# Patient Record
Sex: MALE | Race: WHITE | NOT HISPANIC OR LATINO | Employment: FULL TIME | URBAN - NONMETROPOLITAN AREA
[De-identification: names, ages, dates, MRNs, and addresses within clinical notes are randomized per-mention and may not be internally consistent; named-entity substitution may affect disease eponyms.]

---

## 2017-03-12 DIAGNOSIS — R07.89 CHEST PRESSURE: ICD-10-CM

## 2017-03-13 RX ORDER — RANOLAZINE 500 MG/1
TABLET, FILM COATED, EXTENDED RELEASE ORAL
Qty: 180 TABLET | Refills: 0 | Status: SHIPPED | OUTPATIENT
Start: 2017-03-13 | End: 2017-06-05 | Stop reason: SDUPTHER

## 2017-06-05 DIAGNOSIS — R07.89 CHEST PRESSURE: ICD-10-CM

## 2017-06-06 RX ORDER — RANOLAZINE 500 MG/1
TABLET, FILM COATED, EXTENDED RELEASE ORAL
Qty: 60 TABLET | Refills: 0 | Status: SHIPPED | OUTPATIENT
Start: 2017-06-06 | End: 2017-07-05 | Stop reason: SDUPTHER

## 2017-07-05 DIAGNOSIS — R07.89 CHEST PRESSURE: ICD-10-CM

## 2017-07-05 RX ORDER — RANOLAZINE 500 MG/1
TABLET, FILM COATED, EXTENDED RELEASE ORAL
Qty: 60 TABLET | Refills: 0 | Status: SHIPPED | OUTPATIENT
Start: 2017-07-05 | End: 2017-08-01 | Stop reason: SDUPTHER

## 2017-08-01 DIAGNOSIS — R07.89 CHEST PRESSURE: ICD-10-CM

## 2017-08-01 RX ORDER — RANOLAZINE 500 MG/1
TABLET, FILM COATED, EXTENDED RELEASE ORAL
Qty: 60 TABLET | Refills: 0 | Status: SHIPPED | OUTPATIENT
Start: 2017-08-01 | End: 2017-08-09 | Stop reason: SDUPTHER

## 2017-08-09 ENCOUNTER — OFFICE VISIT (OUTPATIENT)
Dept: CARDIOLOGY | Facility: CLINIC | Age: 62
End: 2017-08-09

## 2017-08-09 VITALS
HEIGHT: 76 IN | HEART RATE: 86 BPM | WEIGHT: 232.25 LBS | BODY MASS INDEX: 28.28 KG/M2 | DIASTOLIC BLOOD PRESSURE: 80 MMHG | OXYGEN SATURATION: 98 % | SYSTOLIC BLOOD PRESSURE: 116 MMHG

## 2017-08-09 DIAGNOSIS — E78.00 HYPERCHOLESTEREMIA: ICD-10-CM

## 2017-08-09 DIAGNOSIS — I25.10 ENDOTHELIAL DYSFUNCTION OF CORONARY ARTERY: Primary | ICD-10-CM

## 2017-08-09 DIAGNOSIS — R07.89 CHEST PRESSURE: ICD-10-CM

## 2017-08-09 DIAGNOSIS — I10 ESSENTIAL HYPERTENSION: ICD-10-CM

## 2017-08-09 PROCEDURE — 99213 OFFICE O/P EST LOW 20 MIN: CPT | Performed by: NURSE PRACTITIONER

## 2017-08-09 RX ORDER — RANOLAZINE 500 MG/1
500 TABLET, EXTENDED RELEASE ORAL 2 TIMES DAILY
Qty: 180 TABLET | Refills: 3 | Status: SHIPPED | OUTPATIENT
Start: 2017-08-09 | End: 2017-08-28 | Stop reason: SDUPTHER

## 2017-08-09 NOTE — PROGRESS NOTES
Chief Complaint   Patient presents with   • Follow-up     Chest pain and fatigue when lifting anything heavy and during intamacy. Would like 90 days of Ranexa sent in.       Subjective       Dell Fowler is a 61 y.o. male history of hypertension, hypercholesterolemia who also has endothelial dysfunction.  Cardiac catheterizations have shown normal coronaries. He has been managed medically.   Today he comes to the office for a follow up appointment and denies new or worsening symptoms. He states he has some stable angina only with significant exertion, but better than before. He continues to walk most days without problem     HPI         Cardiac History:    Past Surgical History:   Procedure Laterality Date   • CARDIAC CATHETERIZATION  11/22/2006    (Dr. Briseno) Normal Coronaries   • CARDIAC CATHETERIZATION  02/29/2016    normal coronaries EF 50%   • CARDIOVASCULAR STRESS TEST  11/21/2006    ([facility] Dr. Briseno) 9 min, 93% THR. EF 48%. Anteroseptal and Inferior Ischemia   • CARDIOVASCULAR STRESS TEST  02/25/2016    6 min 30 sec, 91% THR. EF (s) 24%. (R) 43%. Inferoseptal Ischemia   • ECHO - CONVERTED  11/2006    ([facility]) Dr. Briseno Normal EF    • ECHO - CONVERTED  02/25/2016    EF 45%. Inferoseptal WMA       Current Outpatient Prescriptions   Medication Sig Dispense Refill   • Arginine (L-ARGININE MAXIMUM STRENGTH) 1000 MG tablet Take 1 tablet by mouth 2 (two) times a day. 180 each 3   • aspirin 81 MG EC tablet Take 81 mg by mouth daily.     • atorvastatin (LIPITOR) 10 MG tablet Take 1 tablet by mouth daily. 90 tablet 3   • cetirizine (ZyrTEC) 10 MG tablet Take 10 mg by mouth daily.     • ibuprofen (ADVIL,MOTRIN) 800 MG tablet Take 800 mg by mouth every 6 (six) hours as needed for mild pain (1-3).     • lisinopril (PRINIVIL,ZESTRIL) 5 MG tablet Take 1 tablet by mouth daily. 90 tablet 3   • naproxen (NAPROSYN) 500 MG tablet Take 500 mg by mouth as needed for mild pain (1-3).     • omeprazole (PriLOSEC) 20 MG capsule  Take 1 capsule by mouth daily. 90 capsule 3   • ranolazine (RANEXA) 500 MG 12 hr tablet Take 1 tablet by mouth 2 (Two) Times a Day. 180 tablet 3   • tamsulosin (FLOMAX) 0.4 MG capsule 24 hr capsule Take 1 capsule by mouth every night.       No current facility-administered medications for this visit.        Review of patient's allergies indicates no known allergies.    Past Medical History:   Diagnosis Date   • BPH (benign prostatic hyperplasia)     followed by Dr. Llamas   • GERD (gastroesophageal reflux disease)    • Hyperlipidemia    • Hypertension    • S/P wrist surgery     bilateral surgical repair       Social History     Social History   • Marital status:      Spouse name: N/A   • Number of children: N/A   • Years of education: N/A     Occupational History   • Not on file.     Social History Main Topics   • Smoking status: Former Smoker     Quit date: 1976   • Smokeless tobacco: Never Used   • Alcohol use Yes      Comment: occasional mixed drink   • Drug use: No   • Sexual activity: Not on file     Other Topics Concern   • Not on file     Social History Narrative       Family History   Problem Relation Age of Onset   • COPD Mother    • Heart failure Mother    • Heart attack Father      multiple- first in his 50's   • Heart disease Father      cabg in his 60's   • Heart disease Other      stenting at age 65       Review of Systems   Constitution: Negative for decreased appetite and malaise/fatigue.   HENT: Negative for congestion and sore throat.    Eyes: Negative for blurred vision.   Cardiovascular: Positive for chest pain (pressure type discomfort with signifcant exertion, same ). Negative for dyspnea on exertion, leg swelling, near-syncope and palpitations.   Respiratory: Negative for shortness of breath and sleep disturbances due to breathing.    Endocrine: Negative for cold intolerance and heat intolerance.   Hematologic/Lymphatic: Negative for adenopathy. Does not bruise/bleed easily.   Skin:  "Negative for itching, nail changes and skin cancer.   Musculoskeletal: Negative for arthritis and myalgias.   Gastrointestinal: Negative for abdominal pain, dysphagia and heartburn.   Genitourinary: Negative for dysuria and hematuria.   Neurological: Negative for dizziness, light-headedness, seizures and vertigo.   Psychiatric/Behavioral: Negative for altered mental status. The patient does not have insomnia.    Allergic/Immunologic: Negative for environmental allergies and hives.        Diabetes- No  Thyroid-normal    Objective     /80 (BP Location: Left arm, Patient Position: Sitting, Cuff Size: Adult)  Pulse 86  Ht 76\" (193 cm)  Wt 232 lb 4 oz (105 kg)  SpO2 98%  BMI 28.27 kg/m2    Physical Exam   Constitutional: He is oriented to person, place, and time. He appears well-nourished.   HENT:   Head: Normocephalic.   Eyes: Conjunctivae are normal. Pupils are equal, round, and reactive to light.   Neck: Normal range of motion. Neck supple. No JVD present.   Cardiovascular: Normal rate, regular rhythm, S1 normal and S2 normal.    No murmur heard.  Pulmonary/Chest: Breath sounds normal. He has no rales.   Abdominal: Soft. Bowel sounds are normal. There is no tenderness.   Musculoskeletal: Normal range of motion. He exhibits no edema.   Neurological: He is alert and oriented to person, place, and time.   Skin: Skin is warm and dry. No rash noted. No pallor.   Psychiatric: He has a normal mood and affect. His behavior is normal.      Procedures        Assessment/Plan      Dell was seen today for follow-up.    Diagnoses and all orders for this visit:    Endothelial dysfunction of coronary artery    Essential hypertension    Hypercholesteremia    Chest pressure  -     ranolazine (RANEXA) 500 MG 12 hr tablet; Take 1 tablet by mouth 2 (Two) Times a Day.      Mr. Fowler appears stable from a cardiac standpoint. We will continue same dose of Ranexa and refills given. His vital signs are stable. He maintains " normal daily activity without symptoms. No cardiac testing warranted at this time. I encouraged him to maintain good diet and exercise. He will follow with you for management of labs.   We will plan to see him annually or sooner for problems.              Electronically signed by OREN Kendrick,  August 11, 2017 4:09 PM

## 2017-08-11 PROBLEM — I10 ESSENTIAL HYPERTENSION: Status: ACTIVE | Noted: 2017-08-11

## 2017-08-11 PROBLEM — I25.10 ENDOTHELIAL DYSFUNCTION OF CORONARY ARTERY: Status: ACTIVE | Noted: 2017-08-11

## 2017-08-11 PROBLEM — R07.89 CHEST PRESSURE: Status: ACTIVE | Noted: 2017-08-11

## 2017-08-11 PROBLEM — E78.00 HYPERCHOLESTEREMIA: Status: ACTIVE | Noted: 2017-08-11

## 2017-08-28 ENCOUNTER — TELEPHONE (OUTPATIENT)
Dept: CARDIOLOGY | Facility: CLINIC | Age: 62
End: 2017-08-28

## 2017-08-28 DIAGNOSIS — R07.89 CHEST PRESSURE: ICD-10-CM

## 2017-08-28 RX ORDER — RANOLAZINE 500 MG/1
500 TABLET, EXTENDED RELEASE ORAL 2 TIMES DAILY
Qty: 180 TABLET | Refills: 3 | Status: SHIPPED | OUTPATIENT
Start: 2017-08-28 | End: 2018-05-04 | Stop reason: SDUPTHER

## 2017-08-28 NOTE — TELEPHONE ENCOUNTER
[facility] pharmacy faxed over a 90 day supply request for ranexa. Script sent.

## 2017-11-03 DIAGNOSIS — R07.89 CHEST PRESSURE: ICD-10-CM

## 2017-11-03 DIAGNOSIS — E78.00 HYPERCHOLESTEREMIA: ICD-10-CM

## 2017-11-03 RX ORDER — OMEPRAZOLE 20 MG/1
CAPSULE, DELAYED RELEASE ORAL
Qty: 90 CAPSULE | Refills: 2 | OUTPATIENT
Start: 2017-11-03

## 2017-11-03 RX ORDER — ATORVASTATIN CALCIUM 10 MG/1
TABLET, FILM COATED ORAL
Qty: 30 TABLET | Refills: 0 | Status: SHIPPED | OUTPATIENT
Start: 2017-11-03 | End: 2017-11-28 | Stop reason: SDUPTHER

## 2017-11-19 DIAGNOSIS — I10 ESSENTIAL HYPERTENSION: ICD-10-CM

## 2017-11-20 RX ORDER — LISINOPRIL 5 MG/1
TABLET ORAL
Qty: 90 TABLET | Refills: 2 | Status: SHIPPED | OUTPATIENT
Start: 2017-11-20 | End: 2018-05-14 | Stop reason: SDUPTHER

## 2017-11-24 DIAGNOSIS — E78.00 HYPERCHOLESTEREMIA: ICD-10-CM

## 2017-11-27 RX ORDER — ATORVASTATIN CALCIUM 10 MG/1
TABLET, FILM COATED ORAL
Qty: 90 TABLET | Refills: 1 | OUTPATIENT
Start: 2017-11-27

## 2017-11-28 ENCOUNTER — TELEPHONE (OUTPATIENT)
Dept: CARDIOLOGY | Facility: CLINIC | Age: 62
End: 2017-11-28

## 2017-11-28 DIAGNOSIS — E78.00 HYPERCHOLESTEREMIA: ICD-10-CM

## 2017-11-28 RX ORDER — PSYLLIUM HUSK 0.4 G
CAPSULE ORAL
Qty: 180 EACH | Refills: 3 | Status: SHIPPED | OUTPATIENT
Start: 2017-11-28

## 2017-11-28 RX ORDER — ATORVASTATIN CALCIUM 10 MG/1
10 TABLET, FILM COATED ORAL DAILY
Qty: 90 TABLET | Refills: 0 | Status: SHIPPED | OUTPATIENT
Start: 2017-11-28 | End: 2018-02-15 | Stop reason: SDUPTHER

## 2017-11-28 NOTE — TELEPHONE ENCOUNTER
Patient called in requesting 90 day refill for Atorvastatin be sent into pharmacy due to insurance will not pay for 30 day refill's. Informed patient will need labs for further refill's. States had labs done today and will have copy faxed to our office.

## 2018-02-15 DIAGNOSIS — E78.00 HYPERCHOLESTEREMIA: ICD-10-CM

## 2018-02-16 RX ORDER — ATORVASTATIN CALCIUM 10 MG/1
TABLET, FILM COATED ORAL
Qty: 90 TABLET | Refills: 3 | Status: SHIPPED | OUTPATIENT
Start: 2018-02-16 | End: 2018-05-14 | Stop reason: SDUPTHER

## 2018-05-04 ENCOUNTER — TELEPHONE (OUTPATIENT)
Dept: CARDIOLOGY | Facility: CLINIC | Age: 63
End: 2018-05-04

## 2018-05-04 DIAGNOSIS — R07.89 CHEST PRESSURE: ICD-10-CM

## 2018-05-04 RX ORDER — RANOLAZINE 500 MG/1
500 TABLET, EXTENDED RELEASE ORAL EVERY 12 HOURS SCHEDULED
Qty: 180 TABLET | Refills: 3 | Status: SHIPPED | OUTPATIENT
Start: 2018-05-04 | End: 2018-05-15

## 2018-05-04 NOTE — TELEPHONE ENCOUNTER
Patient's copay of Ranexa went up to >$200 per month. Pt is asking for a written Rx so he can look around to see if he can find it cheaper. Written RX printed for patient to pick-up.

## 2018-05-14 ENCOUNTER — TELEPHONE (OUTPATIENT)
Dept: CARDIOLOGY | Facility: CLINIC | Age: 63
End: 2018-05-14

## 2018-05-14 DIAGNOSIS — E78.00 HYPERCHOLESTEREMIA: ICD-10-CM

## 2018-05-14 DIAGNOSIS — I10 ESSENTIAL HYPERTENSION: ICD-10-CM

## 2018-05-14 RX ORDER — ATORVASTATIN CALCIUM 10 MG/1
10 TABLET, FILM COATED ORAL DAILY
Qty: 90 TABLET | Refills: 3 | Status: SHIPPED | OUTPATIENT
Start: 2018-05-14 | End: 2019-10-31 | Stop reason: SDUPTHER

## 2018-05-14 RX ORDER — LISINOPRIL 5 MG/1
5 TABLET ORAL DAILY
Qty: 90 TABLET | Refills: 3 | Status: SHIPPED | OUTPATIENT
Start: 2018-05-14 | End: 2019-10-31 | Stop reason: SDUPTHER

## 2018-05-14 NOTE — TELEPHONE ENCOUNTER
Imdur 30 mg 1/2 tablet daily, can try taking at night to avoid side effects. If he develops chest pain then we can try increasing to full tablet or 1/2 tablet bid. Thanks.

## 2018-05-14 NOTE — TELEPHONE ENCOUNTER
Patient requesting scripts for Lisinopril and Atorvastatin to be sent to [facility] 90 day supplies. Script sent on 5/14/18

## 2018-05-14 NOTE — TELEPHONE ENCOUNTER
Patient called and is concerned about the cost of his Ranexa. Asking if we could change to something different. Patient states he has never tried imdur.

## 2018-05-15 RX ORDER — ISOSORBIDE MONONITRATE 30 MG/1
TABLET, EXTENDED RELEASE ORAL
Qty: 30 TABLET | Refills: 0 | Status: SHIPPED | OUTPATIENT
Start: 2018-05-15 | End: 2018-06-26 | Stop reason: SDUPTHER

## 2018-05-15 NOTE — TELEPHONE ENCOUNTER
Spoke with patient. He is aware of recommendations. Script sent to pharmacy. Advised to call if any problems. Pt voiced understanding.

## 2018-06-26 ENCOUNTER — TELEPHONE (OUTPATIENT)
Dept: CARDIOLOGY | Facility: CLINIC | Age: 63
End: 2018-06-26

## 2018-06-26 RX ORDER — ISOSORBIDE MONONITRATE 30 MG/1
TABLET, EXTENDED RELEASE ORAL
Qty: 30 TABLET | Refills: 5 | Status: SHIPPED | OUTPATIENT
Start: 2018-06-26 | End: 2018-12-16 | Stop reason: SDUPTHER

## 2018-06-26 RX ORDER — RANOLAZINE 500 MG/1
500 TABLET, EXTENDED RELEASE ORAL DAILY
Qty: 14 TABLET | Refills: 0 | COMMUNITY
Start: 2018-06-26 | End: 2019-07-23 | Stop reason: SDUPTHER

## 2018-06-26 NOTE — TELEPHONE ENCOUNTER
Patient called with an update regarding imdur (see telephone encounter from 05/14/18). States he tried taking just 30 mg 1/2 daily and was still having some chest pressure. He tried increasing to 1/2 tab BID and was having headache. States that he now takes Ranexa 500 mg in the morning and Imdur 30 mg 1/2 tab at night and this seems to be working. Asking if you were okay with this combination.

## 2018-06-26 NOTE — TELEPHONE ENCOUNTER
I agree to continue combination of medication based on improvement of symptoms and tolerance. Thanks.

## 2018-06-26 NOTE — TELEPHONE ENCOUNTER
Patient aware. Samples of Ranexa ready for pickup. I have also included some discount cards. Script for Imdur sent to pharmacy.

## 2018-08-13 ENCOUNTER — OFFICE VISIT (OUTPATIENT)
Dept: CARDIOLOGY | Facility: CLINIC | Age: 63
End: 2018-08-13

## 2018-08-13 VITALS
HEIGHT: 76 IN | WEIGHT: 239 LBS | SYSTOLIC BLOOD PRESSURE: 120 MMHG | HEART RATE: 72 BPM | BODY MASS INDEX: 29.1 KG/M2 | DIASTOLIC BLOOD PRESSURE: 80 MMHG

## 2018-08-13 DIAGNOSIS — I10 ESSENTIAL HYPERTENSION: Primary | ICD-10-CM

## 2018-08-13 DIAGNOSIS — E66.3 OVERWEIGHT: ICD-10-CM

## 2018-08-13 DIAGNOSIS — R07.89 CHEST PRESSURE: ICD-10-CM

## 2018-08-13 DIAGNOSIS — E78.00 HYPERCHOLESTEREMIA: ICD-10-CM

## 2018-08-13 DIAGNOSIS — I25.10 ENDOTHELIAL DYSFUNCTION OF CORONARY ARTERY: ICD-10-CM

## 2018-08-13 PROCEDURE — 99213 OFFICE O/P EST LOW 20 MIN: CPT | Performed by: NURSE PRACTITIONER

## 2018-08-13 NOTE — PROGRESS NOTES
Chief Complaint   Patient presents with   • Follow-up     For cardiac management. Patient is on aspirin. Reports that he occasionally has chest pains, but not as often as he was. Last lab work was done on 11/28/17 per PCP, in chart under media.    • Med Refill     Did not bring medication list.        Cardiac Complaints  chest pressure/discomfort      Subjective   Dell Fowler is a 62 y.o. male with hypertension, hypercholesterolemia, and endothelial dysfunction.  Cardiac catheterizations have shown normal coronaries. He has been managed medically. He returns today for follow up and denies any new concerns.  He does have chest pain randomly but states much better with ranexa and imdur. No recent med list is available for review.  Labs last done in November of 2017 but states they will be done again in November of this year.  He does state ranexa and imdur therapy have helped as well and with ranexa 500 mg in AM and imdur 30 mg 1/2 at night.  No medication list is currently available for review, need for refills denied.      Cardiac History  Past Surgical History:   Procedure Laterality Date   • CARDIAC CATHETERIZATION  11/22/2006    (Dr. Briseno) Normal Coronaries   • CARDIAC CATHETERIZATION  02/29/2016    normal coronaries EF 50%   • CARDIOVASCULAR STRESS TEST  11/21/2006    ([facility] Dr. Briseno) 9 min, 93% THR. EF 48%. Anteroseptal and Inferior Ischemia   • CARDIOVASCULAR STRESS TEST  02/25/2016    6 min 30 sec, 91% THR. EF (s) 24%. (R) 43%. Inferoseptal Ischemia   • ECHO - CONVERTED  11/2006    ([facility]) Dr. Briseno Normal EF    • ECHO - CONVERTED  02/25/2016    EF 45%. Inferoseptal WMA       Current Outpatient Prescriptions   Medication Sig Dispense Refill   • aspirin 81 MG EC tablet Take 81 mg by mouth daily.     • atorvastatin (LIPITOR) 10 MG tablet Take 1 tablet by mouth Daily. 90 tablet 3   • cetirizine (ZyrTEC) 10 MG tablet Take 10 mg by mouth daily.     • ibuprofen (ADVIL,MOTRIN) 800 MG tablet Take 800 mg by  mouth every 6 (six) hours as needed for mild pain (1-3).     • isosorbide mononitrate (IMDUR) 30 MG 24 hr tablet Take 1/2-1 tablet by mouth daily as directed 30 tablet 5   • L-ARGININE MAXIMUM STRENGTH 1000 MG tablet TAKE 1 TABLET BY MOUTH 2 (TWO) TIMES A DAY. 180 each 3   • lisinopril (PRINIVIL,ZESTRIL) 5 MG tablet Take 1 tablet by mouth Daily. 90 tablet 3   • naproxen (NAPROSYN) 500 MG tablet Take 500 mg by mouth as needed for mild pain (1-3).     • omeprazole (PriLOSEC) 20 MG capsule Take 1 capsule by mouth daily. 90 capsule 3   • ranolazine (RANEXA) 500 MG 12 hr tablet Take 1 tablet by mouth Daily. 14 tablet 0   • tamsulosin (FLOMAX) 0.4 MG capsule 24 hr capsule Take 1 capsule by mouth every night.       No current facility-administered medications for this visit.        Patient has no known allergies.    Past Medical History:   Diagnosis Date   • BPH (benign prostatic hyperplasia)     followed by Dr. Llamas   • GERD (gastroesophageal reflux disease)    • Hyperlipidemia    • Hypertension    • S/P wrist surgery     bilateral surgical repair       Social History     Social History   • Marital status:      Spouse name: N/A   • Number of children: N/A   • Years of education: N/A     Occupational History   • Not on file.     Social History Main Topics   • Smoking status: Former Smoker     Quit date: 1976   • Smokeless tobacco: Never Used   • Alcohol use Yes      Comment: occasional mixed drink   • Drug use: No   • Sexual activity: Not on file     Other Topics Concern   • Not on file     Social History Narrative   • No narrative on file       Family History   Problem Relation Age of Onset   • COPD Mother    • Heart failure Mother    • Heart attack Father         multiple- first in his 50's   • Heart disease Father         cabg in his 60's   • Heart disease Other         stenting at age 65       Review of Systems   Constitution: Negative for weakness and malaise/fatigue.   Cardiovascular: Positive for chest pain.  "Negative for dyspnea on exertion, irregular heartbeat, near-syncope, orthopnea, palpitations and syncope.   Respiratory: Negative for shortness of breath and wheezing.    Musculoskeletal: Negative for back pain, joint swelling and muscle cramps.   Gastrointestinal: Negative for anorexia, flatus, nausea and vomiting.   Genitourinary: Negative for dysuria, hematuria, hesitancy and nocturia.   Neurological: Negative for dizziness, light-headedness and loss of balance.   Psychiatric/Behavioral: Negative for depression and memory loss. The patient is not nervous/anxious.            Objective     /80 (BP Location: Right arm)   Pulse 72   Ht 193 cm (75.98\")   Wt 108 kg (239 lb)   BMI 29.10 kg/m²     Physical Exam   Constitutional: He is oriented to person, place, and time. He appears well-developed and well-nourished.   HENT:   Head: Normocephalic and atraumatic.   Eyes: Pupils are equal, round, and reactive to light. EOM are normal.   Neck: Normal range of motion. Neck supple.   Cardiovascular: Normal rate and regular rhythm.    Pulmonary/Chest: Effort normal and breath sounds normal.   Abdominal: Soft.   Musculoskeletal: Normal range of motion.   Neurological: He is alert and oriented to person, place, and time.   Skin: Skin is warm and dry.   Psychiatric: He has a normal mood and affect.       Procedures    Assessment/Plan     HR and BP are stable today.  HTN remains well managed on current, no adjustment advised.  For endothelial dysfunction and chest pain, he remains well managed on current imdur and ranexa therapy.  He was advised to continue both at current and he may use an extra half of imdur if needed for chest pain.  Labs including FLP he reports with your office for lipid management.  He continues use of statin without concerns.  Could we have next labs from November for our review?  BMI elevated at 29, good cardiac diet with limited caloric intake, saturated fats, starches, and fried foods " encouraged.  Yearly follow up will be continued as cardiac status appears stable and no new or worsening cardiac concerns are voiced.  If sooner follow up should be needed, patient encouraged to call.        Problems Addressed this Visit        Cardiovascular and Mediastinum    Endothelial dysfunction of coronary artery    Essential hypertension - Primary    Hypercholesteremia       Nervous and Auditory    Chest pressure          Patient's Body mass index is 29.1 kg/m². BMI is above normal parameters. Recommendations include: nutrition counseling.          Electronically signed by OREN Hampton August 13, 2018 4:09 PM

## 2018-12-17 RX ORDER — ISOSORBIDE MONONITRATE 30 MG/1
TABLET, EXTENDED RELEASE ORAL
Qty: 30 TABLET | Refills: 5 | Status: SHIPPED | OUTPATIENT
Start: 2018-12-17 | End: 2019-07-18 | Stop reason: SINTOL

## 2019-07-18 ENCOUNTER — TELEPHONE (OUTPATIENT)
Dept: CARDIOLOGY | Facility: CLINIC | Age: 64
End: 2019-07-18

## 2019-07-18 NOTE — TELEPHONE ENCOUNTER
"Patient called in and said  PCP was wanting to take him off the Isosorbide 30 mg d/t  Low blood pressure 90/60 . He has a follow up appt 7-23-19 12pm with you . He is going to be going out of town this weekend and was wondering if he would \" mess himself up \" if he  Quits taking the Isosorbide .  "

## 2019-07-18 NOTE — TELEPHONE ENCOUNTER
I spoke with patient about discontinuing Isosorbide . Patient advised to take his medication with him as he goes out of town.  He states understanding

## 2019-07-18 NOTE — TELEPHONE ENCOUNTER
According to his last note, he is also taking Ranexa which does not lower BP. Continue Ranexa and agree to stop Imdur due to low BP. He can take Imdur with him and if he has any issues can take 1/2 tablet if needed.

## 2019-07-23 ENCOUNTER — OFFICE VISIT (OUTPATIENT)
Dept: CARDIOLOGY | Facility: CLINIC | Age: 64
End: 2019-07-23

## 2019-07-23 VITALS
SYSTOLIC BLOOD PRESSURE: 130 MMHG | HEART RATE: 76 BPM | BODY MASS INDEX: 30.31 KG/M2 | DIASTOLIC BLOOD PRESSURE: 74 MMHG | WEIGHT: 236.2 LBS | HEIGHT: 74 IN

## 2019-07-23 DIAGNOSIS — E78.00 HYPERCHOLESTEREMIA: ICD-10-CM

## 2019-07-23 DIAGNOSIS — R53.83 OTHER FATIGUE: ICD-10-CM

## 2019-07-23 DIAGNOSIS — I10 ESSENTIAL HYPERTENSION: ICD-10-CM

## 2019-07-23 DIAGNOSIS — I25.10 ENDOTHELIAL DYSFUNCTION OF CORONARY ARTERY: ICD-10-CM

## 2019-07-23 DIAGNOSIS — R42 DIZZINESS: ICD-10-CM

## 2019-07-23 DIAGNOSIS — R42 EPISODIC LIGHTHEADEDNESS: ICD-10-CM

## 2019-07-23 DIAGNOSIS — I51.9 LV DYSFUNCTION: Primary | ICD-10-CM

## 2019-07-23 PROCEDURE — 93000 ELECTROCARDIOGRAM COMPLETE: CPT | Performed by: NURSE PRACTITIONER

## 2019-07-23 PROCEDURE — 99214 OFFICE O/P EST MOD 30 MIN: CPT | Performed by: NURSE PRACTITIONER

## 2019-07-23 RX ORDER — ISOSORBIDE DINITRATE 30 MG/1
30 TABLET ORAL 2 TIMES DAILY PRN
COMMUNITY
End: 2019-10-31

## 2019-07-23 RX ORDER — FINASTERIDE 5 MG/1
5 TABLET, FILM COATED ORAL DAILY
COMMUNITY

## 2019-07-23 RX ORDER — RANOLAZINE 500 MG/1
500 TABLET, EXTENDED RELEASE ORAL 2 TIMES DAILY
Qty: 60 TABLET | Refills: 6 | COMMUNITY
Start: 2019-07-23 | End: 2019-07-24 | Stop reason: SDUPTHER

## 2019-07-23 NOTE — PROGRESS NOTES
Chief Complaint   Patient presents with   • Follow-up     Cardiac management . PCP wanted patient to be seen d/t SOA , Hypotension and dizziness ..Says he has no energy . He takes Aspirin 81 mg daily . Has an appointment scheduled to come back to this office 8-13-19. Monitors BP at home daily.   • Chest Pain     Has had 2 episodes requiring him to take his Isosorbide, since Friday 7-19-19 .. Hypotensive episodes have been close to time of taking Isosorbide    • Med Refill     Does not need refills today . Had medication list with him today.       Subjective       Dell Fowler is a 63 y.o. male  with hypertension, hypercholesterolemia, and endothelial dysfunction.  Cardiac catheterizations have shown normal coronaries. He has been managed medically. Recently has had urology issues with prostate and kidney stone.     Today he returns due to issues with dizziness, generalized weakness and chest pain. His blood pressure has been low and Isosorbide stopped. When he stopped the long acting Nitrate he developed chest pressure on two occassions for which he took 1/2 tablet of nitrate along with rest and symptoms resolved. No palpitations noted. With normal daily activity, he feels more shortness of breath and fatigue.      HPI     Cardiac History:    Past Surgical History:   Procedure Laterality Date   • CARDIAC CATHETERIZATION  11/22/2006    (Dr. Briseno) Normal Coronaries   • CARDIAC CATHETERIZATION  02/29/2016    normal coronaries EF 50%   • CARDIOVASCULAR STRESS TEST  11/21/2006    ([facility] Dr. Briseno) 9 min, 93% THR. EF 48%. Anteroseptal and Inferior Ischemia   • CARDIOVASCULAR STRESS TEST  02/25/2016    6 min 30 sec, 91% THR. EF (s) 24%. (R) 43%. Inferoseptal Ischemia   • ECHO - CONVERTED  11/2006    ([facility]) Dr. Briseno Normal EF    • ECHO - CONVERTED  02/25/2016    EF 45%. Inferoseptal WMA       Current Outpatient Medications   Medication Sig Dispense Refill   • aspirin 81 MG EC tablet Take 81 mg by mouth daily.      • atorvastatin (LIPITOR) 10 MG tablet Take 1 tablet by mouth Daily. 90 tablet 3   • cetirizine (ZyrTEC) 10 MG tablet Take 10 mg by mouth daily.     • finasteride (PROSCAR) 5 MG tablet Take 5 mg by mouth Daily.     • isosorbide dinitrate (ISORDIL) 30 MG tablet Take 30 mg by mouth 2 (Two) Times a Day As Needed. Takes 1/2 tablet PRN     • L-ARGININE MAXIMUM STRENGTH 1000 MG tablet TAKE 1 TABLET BY MOUTH 2 (TWO) TIMES A DAY. 180 each 3   • lisinopril (PRINIVIL,ZESTRIL) 5 MG tablet Take 1 tablet by mouth Daily. 90 tablet 3   • omeprazole (PriLOSEC) 20 MG capsule Take 1 capsule by mouth daily. 90 capsule 3   • tamsulosin (FLOMAX) 0.4 MG capsule 24 hr capsule Take 1 capsule by mouth every night.     • ranolazine (RANEXA) 500 MG 12 hr tablet Take 1 tablet by mouth 2 (Two) Times a Day. 60 tablet 6     No current facility-administered medications for this visit.        Patient has no known allergies.    Past Medical History:   Diagnosis Date   • BPH (benign prostatic hyperplasia)     followed by Dr. Llamas   • GERD (gastroesophageal reflux disease)    • Hyperlipidemia    • Hypertension    • S/P wrist surgery     bilateral surgical repair       Social History     Socioeconomic History   • Marital status:      Spouse name: Not on file   • Number of children: Not on file   • Years of education: Not on file   • Highest education level: Not on file   Tobacco Use   • Smoking status: Former Smoker     Last attempt to quit:      Years since quittin.5   • Smokeless tobacco: Never Used   Substance and Sexual Activity   • Alcohol use: Yes     Comment: occasional mixed drink   • Drug use: No       Family History   Problem Relation Age of Onset   • COPD Mother    • Heart failure Mother    • Heart attack Father         multiple- first in his 50's   • Heart disease Father         cabg in his 60's   • Heart disease Other         stenting at age 65       Review of Systems   Constitution: Positive for weakness and  "malaise/fatigue. Negative for decreased appetite.   HENT: Negative for congestion, hoarse voice and nosebleeds.    Eyes: Negative for blurred vision, double vision, redness and visual disturbance.   Cardiovascular: Positive for chest pain. Negative for leg swelling, near-syncope and palpitations.   Respiratory: Positive for shortness of breath and snoring. Negative for cough and sleep disturbances due to breathing.    Endocrine: Negative for polydipsia and polyphagia.   Hematologic/Lymphatic: Negative for bleeding problem. Does not bruise/bleed easily.   Skin: Negative for dry skin, flushing and itching.   Musculoskeletal: Negative for falls, joint pain, muscle cramps and muscle weakness.   Gastrointestinal: Positive for heartburn (has gotten worse at night). Negative for bloating, abdominal pain, dysphagia and melena.   Genitourinary: Positive for frequency and urgency. Negative for dysuria and hematuria.        Currently following with  for management of prostate.   Neurological: Positive for dizziness and light-headedness. Negative for headaches, numbness and paresthesias.   Psychiatric/Behavioral: Negative for altered mental status and memory loss. The patient does not have insomnia.    Allergic/Immunologic: Negative for hives and persistent infections.        Objective     /74 (BP Location: Left arm)   Pulse 76   Ht 188 cm (74\")   Wt 107 kg (236 lb 3.2 oz)   BMI 30.33 kg/m²     Physical Exam   Constitutional: He is oriented to person, place, and time. Vital signs are normal. He appears well-nourished. No distress.   HENT:   Head: Normocephalic.   Eyes: Conjunctivae are normal. Pupils are equal, round, and reactive to light.   Neck: Normal range of motion. Neck supple. Carotid bruit is not present.   Cardiovascular: Normal rate, regular rhythm, S1 normal and S2 normal.   No murmur heard.  Pulmonary/Chest: Breath sounds normal. He has no wheezes. He has no rales.   Abdominal: Soft. Bowel sounds are " normal.   Musculoskeletal: Normal range of motion. He exhibits no edema.   Neurological: He is alert and oriented to person, place, and time.   Skin: Skin is warm and dry. No pallor. Nails show no clubbing.   Psychiatric: He has a normal mood and affect. His behavior is normal.          ECG 12 Lead  Date/Time: 7/24/2019 11:55 AM  Performed by: Alpa Yen APRN  Authorized by: Alpa Yen APRN   Comparison: compared with previous ECG from 2/25/2016  Similar to previous ECG  Comparison to previous ECG: EKG per stress test showed sinus rhythm  Rhythm: sinus rhythm  BPM: 79                  Assessment/Plan      Dell was seen today for follow-up, chest pain and med refill.    Diagnoses and all orders for this visit:    LV dysfunction  -     Adult Transthoracic Echo Complete W/ Cont if Necessary Per Protocol; Future  -     Stress Test With Myocardial Perfusion One Day; Future    Essential hypertension  -     Comprehensive Metabolic Panel; Future  -     Adult Transthoracic Echo Complete W/ Cont if Necessary Per Protocol; Future  -     Stress Test With Myocardial Perfusion One Day; Future  -     US Carotid Bilateral; Future    Hypercholesteremia  -     Comprehensive Metabolic Panel; Future  -     Adult Transthoracic Echo Complete W/ Cont if Necessary Per Protocol; Future  -     Stress Test With Myocardial Perfusion One Day; Future  -     US Carotid Bilateral; Future    Endothelial dysfunction of coronary artery    Dizziness  -     Adult Transthoracic Echo Complete W/ Cont if Necessary Per Protocol; Future  -     Stress Test With Myocardial Perfusion One Day; Future  -     US Carotid Bilateral; Future    Episodic lightheadedness  -     Adult Transthoracic Echo Complete W/ Cont if Necessary Per Protocol; Future  -     Stress Test With Myocardial Perfusion One Day; Future  -     US Carotid Bilateral; Future    Other fatigue  -     TSH; Future  -     Vitamin B12; Future  -     Adult Transthoracic Echo Complete W/  Cont if Necessary Per Protocol; Future  -     Stress Test With Myocardial Perfusion One Day; Future    Other orders  -     Discontinue: ranolazine (RANEXA) 500 MG 12 hr tablet; Take 1 tablet by mouth 2 (Two) Times a Day.  -     ECG 12 Lead      Mr. Fowler presents to the office with concern over recent symptoms.  He admits to exertional chest discomfort that resolves with rest and resuming nitrate.  In the past he found Ranexa beneficial but had stopped due to cost.  Now the medication is available generic we will retry Ranexa 500 mg twice daily.  To rule out ischemic cause a nuclear stress test ordered.  To reassess LV dysfunction and look at valvular structure as well as PA pressure, an echocardiogram ordered.  He has not had carotid ultrasound in the past.  Due to dizziness we will obtain a carotid ultrasound to evaluate for any stenosis.    Today his blood pressure, heart rate, and heart rhythm are in normal range.  Agree with holding isosorbide.  Prescription given to restart Ranexa.  Advised to continue low-dose lisinopril as well as low-dose aspirin.    Baseline EKG done today that shows sinus rhythm. We will look for any arrhythmia on treadmill stress test.     Lab order placed for vitamin B12 and TSH which can be done same day as cardiac testing.    Patient's Body mass index is 30.33 kg/m². BMI is above normal parameters. Recommendations include: nutrition counseling.  Mediterranean diet advised.    Further recommendations based on test results.  We will keep appointment already scheduled.  Please call sooner for cardiac concerns.           Electronically signed by OREN Kendrick,  July 24, 2019 12:02 PM

## 2019-07-24 RX ORDER — RANOLAZINE 500 MG/1
500 TABLET, EXTENDED RELEASE ORAL 2 TIMES DAILY
Qty: 60 TABLET | Refills: 6 | Status: SHIPPED | OUTPATIENT
Start: 2019-07-24 | End: 2019-10-31 | Stop reason: SDUPTHER

## 2019-08-06 ENCOUNTER — LAB (OUTPATIENT)
Dept: LAB | Facility: HOSPITAL | Age: 64
End: 2019-08-06

## 2019-08-06 ENCOUNTER — HOSPITAL ENCOUNTER (OUTPATIENT)
Dept: CARDIOLOGY | Facility: HOSPITAL | Age: 64
Discharge: HOME | End: 2019-08-06

## 2019-08-06 DIAGNOSIS — I10 ESSENTIAL HYPERTENSION: ICD-10-CM

## 2019-08-06 DIAGNOSIS — E78.00 HYPERCHOLESTEREMIA: ICD-10-CM

## 2019-08-06 DIAGNOSIS — I51.9 LV DYSFUNCTION: ICD-10-CM

## 2019-08-06 DIAGNOSIS — R42 DIZZINESS: ICD-10-CM

## 2019-08-06 DIAGNOSIS — R42 EPISODIC LIGHTHEADEDNESS: ICD-10-CM

## 2019-08-06 DIAGNOSIS — R53.83 OTHER FATIGUE: ICD-10-CM

## 2019-08-06 LAB
ALBUMIN SERPL-MCNC: 4.33 G/DL (ref 3.5–5.2)
ALBUMIN/GLOB SERPL: 1.4 G/DL
ALP SERPL-CCNC: 84 U/L (ref 39–117)
ALT SERPL W P-5'-P-CCNC: 31 U/L (ref 1–41)
ANION GAP SERPL CALCULATED.3IONS-SCNC: 13.7 MMOL/L (ref 5–15)
AST SERPL-CCNC: 24 U/L (ref 1–40)
BH CV ECHO MEAS - ACS: 2 CM
BH CV ECHO MEAS - AO MEAN PG: 1.6 MMHG
BH CV ECHO MEAS - AO ROOT AREA (BSA CORRECTED): 1.7
BH CV ECHO MEAS - AO ROOT AREA: 12.4 CM^2
BH CV ECHO MEAS - AO ROOT DIAM: 4 CM
BH CV ECHO MEAS - AO V2 MEAN: 58.6 CM/SEC
BH CV ECHO MEAS - AO V2 VTI: 14.6 CM
BH CV ECHO MEAS - BSA(HAYCOCK): 2.4 M^2
BH CV ECHO MEAS - BSA: 2.3 M^2
BH CV ECHO MEAS - BZI_BMI: 30.3 KILOGRAMS/M^2
BH CV ECHO MEAS - BZI_METRIC_HEIGHT: 188 CM
BH CV ECHO MEAS - BZI_METRIC_WEIGHT: 107.1 KG
BH CV ECHO MEAS - EDV(CUBED): 56.1 ML
BH CV ECHO MEAS - EDV(MOD-SP2): 73 ML
BH CV ECHO MEAS - EDV(MOD-SP4): 73 ML
BH CV ECHO MEAS - EDV(TEICH): 63.1 ML
BH CV ECHO MEAS - EF(CUBED): 30.1 %
BH CV ECHO MEAS - EF(MOD-SP2): 43.8 %
BH CV ECHO MEAS - EF(MOD-SP4): 57.5 %
BH CV ECHO MEAS - EF(TEICH): 24.9 %
BH CV ECHO MEAS - ESV(CUBED): 39.2 ML
BH CV ECHO MEAS - ESV(MOD-SP2): 41 ML
BH CV ECHO MEAS - ESV(MOD-SP4): 31 ML
BH CV ECHO MEAS - ESV(TEICH): 47.4 ML
BH CV ECHO MEAS - FS: 11.2 %
BH CV ECHO MEAS - IVS/LVPW: 0.91
BH CV ECHO MEAS - IVSD: 1.3 CM
BH CV ECHO MEAS - LA DIMENSION: 3.2 CM
BH CV ECHO MEAS - LA/AO: 0.81
BH CV ECHO MEAS - LV DIASTOLIC VOL/BSA (35-75): 31.3 ML/M^2
BH CV ECHO MEAS - LV IVRT: 0.12 SEC
BH CV ECHO MEAS - LV MASS(C)D: 197.5 GRAMS
BH CV ECHO MEAS - LV MASS(C)DI: 84.7 GRAMS/M^2
BH CV ECHO MEAS - LV SYSTOLIC VOL/BSA (12-30): 13.3 ML/M^2
BH CV ECHO MEAS - LVIDD: 3.8 CM
BH CV ECHO MEAS - LVIDS: 3.4 CM
BH CV ECHO MEAS - LVLD AP2: 6.5 CM
BH CV ECHO MEAS - LVLD AP4: 5.9 CM
BH CV ECHO MEAS - LVLS AP2: 5.7 CM
BH CV ECHO MEAS - LVLS AP4: 5 CM
BH CV ECHO MEAS - LVOT AREA (M): 5.7 CM^2
BH CV ECHO MEAS - LVOT AREA: 5.8 CM^2
BH CV ECHO MEAS - LVOT DIAM: 2.7 CM
BH CV ECHO MEAS - LVPWD: 1.5 CM
BH CV ECHO MEAS - MV A MAX VEL: 79.5 CM/SEC
BH CV ECHO MEAS - MV DEC SLOPE: 256 CM/SEC^2
BH CV ECHO MEAS - MV E MAX VEL: 62.7 CM/SEC
BH CV ECHO MEAS - MV E/A: 0.79
BH CV ECHO MEAS - RVDD: 2.6 CM
BH CV ECHO MEAS - SI(AO): 77.4 ML/M^2
BH CV ECHO MEAS - SI(CUBED): 7.2 ML/M^2
BH CV ECHO MEAS - SI(MOD-SP2): 13.7 ML/M^2
BH CV ECHO MEAS - SI(MOD-SP4): 18 ML/M^2
BH CV ECHO MEAS - SI(TEICH): 6.7 ML/M^2
BH CV ECHO MEAS - SV(AO): 180.4 ML
BH CV ECHO MEAS - SV(CUBED): 16.9 ML
BH CV ECHO MEAS - SV(MOD-SP2): 32 ML
BH CV ECHO MEAS - SV(MOD-SP4): 42 ML
BH CV ECHO MEAS - SV(TEICH): 15.7 ML
BH CV NUCLEAR PRIOR STUDY: 3
BH CV STRESS BP STAGE 1: NORMAL
BH CV STRESS BP STAGE 2: NORMAL
BH CV STRESS DURATION MIN STAGE 1: 3
BH CV STRESS DURATION MIN STAGE 2: 3
BH CV STRESS DURATION SEC STAGE 1: 0
BH CV STRESS DURATION SEC STAGE 2: 0
BH CV STRESS GRADE STAGE 1: 10
BH CV STRESS GRADE STAGE 2: 12
BH CV STRESS HR STAGE 1: 118
BH CV STRESS HR STAGE 2: 144
BH CV STRESS METS STAGE 1: 5
BH CV STRESS METS STAGE 2: 7.5
BH CV STRESS PROTOCOL 1: NORMAL
BH CV STRESS RECOVERY BP: NORMAL MMHG
BH CV STRESS RECOVERY HR: 100 BPM
BH CV STRESS SPEED STAGE 1: 1.7
BH CV STRESS SPEED STAGE 2: 2.5
BH CV STRESS STAGE 1: 1
BH CV STRESS STAGE 2: 2
BILIRUB SERPL-MCNC: 0.5 MG/DL (ref 0.2–1.2)
BUN BLD-MCNC: 20 MG/DL (ref 8–23)
BUN/CREAT SERPL: 15 (ref 7–25)
CALCIUM SPEC-SCNC: 9.6 MG/DL (ref 8.6–10.5)
CHLORIDE SERPL-SCNC: 101 MMOL/L (ref 98–107)
CO2 SERPL-SCNC: 24.3 MMOL/L (ref 22–29)
CREAT BLD-MCNC: 1.33 MG/DL (ref 0.76–1.27)
GFR SERPL CREATININE-BSD FRML MDRD: 54 ML/MIN/1.73
GLOBULIN UR ELPH-MCNC: 3.1 GM/DL
GLUCOSE BLD-MCNC: 116 MG/DL (ref 65–99)
LV EF NUC BP: 42 %
MAXIMAL PREDICTED HEART RATE: 156 BPM
MAXIMAL PREDICTED HEART RATE: 156 BPM
PERCENT MAX PREDICTED HR: 92.31 %
POTASSIUM BLD-SCNC: 4.2 MMOL/L (ref 3.5–5.2)
PROT SERPL-MCNC: 7.4 G/DL (ref 6–8.5)
SODIUM BLD-SCNC: 139 MMOL/L (ref 136–145)
STRESS BASELINE BP: NORMAL MMHG
STRESS BASELINE HR: 92 BPM
STRESS PERCENT HR: 109 %
STRESS POST ESTIMATED WORKLOAD: 7 METS
STRESS POST EXERCISE DUR MIN: 6 MIN
STRESS POST PEAK BP: NORMAL MMHG
STRESS POST PEAK HR: 144 BPM
STRESS TARGET HR: 133 BPM
STRESS TARGET HR: 133 BPM
TSH SERPL DL<=0.05 MIU/L-ACNC: 3.01 MIU/ML (ref 0.27–4.2)

## 2019-08-06 PROCEDURE — 93306 TTE W/DOPPLER COMPLETE: CPT

## 2019-08-06 PROCEDURE — 0 TECHNETIUM SESTAMIBI: Performed by: INTERNAL MEDICINE

## 2019-08-06 PROCEDURE — 93880 EXTRACRANIAL BILAT STUDY: CPT | Performed by: RADIOLOGY

## 2019-08-06 PROCEDURE — A9500 TC99M SESTAMIBI: HCPCS | Performed by: INTERNAL MEDICINE

## 2019-08-06 PROCEDURE — 93880 EXTRACRANIAL BILAT STUDY: CPT

## 2019-08-06 PROCEDURE — 80053 COMPREHEN METABOLIC PANEL: CPT | Performed by: NURSE PRACTITIONER

## 2019-08-06 PROCEDURE — 93017 CV STRESS TEST TRACING ONLY: CPT

## 2019-08-06 PROCEDURE — 84443 ASSAY THYROID STIM HORMONE: CPT | Performed by: NURSE PRACTITIONER

## 2019-08-06 PROCEDURE — 36415 COLL VENOUS BLD VENIPUNCTURE: CPT

## 2019-08-06 PROCEDURE — 78452 HT MUSCLE IMAGE SPECT MULT: CPT | Performed by: INTERNAL MEDICINE

## 2019-08-06 PROCEDURE — 93018 CV STRESS TEST I&R ONLY: CPT | Performed by: INTERNAL MEDICINE

## 2019-08-06 PROCEDURE — 82607 VITAMIN B-12: CPT | Performed by: NURSE PRACTITIONER

## 2019-08-06 PROCEDURE — 93306 TTE W/DOPPLER COMPLETE: CPT | Performed by: INTERNAL MEDICINE

## 2019-08-06 PROCEDURE — 78452 HT MUSCLE IMAGE SPECT MULT: CPT

## 2019-08-06 RX ADMIN — TECHNETIUM TC 99M SESTAMIBI 1 DOSE: 1 INJECTION INTRAVENOUS at 09:45

## 2019-08-06 RX ADMIN — TECHNETIUM TC 99M SESTAMIBI 1 DOSE: 1 INJECTION INTRAVENOUS at 08:16

## 2019-08-07 LAB — VIT B12 BLD-MCNC: 512 PG/ML (ref 211–946)

## 2019-08-07 RX ORDER — CARVEDILOL 3.12 MG/1
3.12 TABLET ORAL 2 TIMES DAILY
Qty: 60 TABLET | Refills: 11 | Status: SHIPPED | OUTPATIENT
Start: 2019-08-07 | End: 2019-10-31 | Stop reason: SDUPTHER

## 2019-08-13 ENCOUNTER — OFFICE VISIT (OUTPATIENT)
Dept: CARDIOLOGY | Facility: CLINIC | Age: 64
End: 2019-08-13

## 2019-08-13 ENCOUNTER — TELEPHONE (OUTPATIENT)
Dept: CARDIOLOGY | Facility: CLINIC | Age: 64
End: 2019-08-13

## 2019-08-13 ENCOUNTER — LAB (OUTPATIENT)
Dept: LAB | Facility: HOSPITAL | Age: 64
End: 2019-08-13

## 2019-08-13 VITALS
DIASTOLIC BLOOD PRESSURE: 64 MMHG | SYSTOLIC BLOOD PRESSURE: 118 MMHG | WEIGHT: 238.6 LBS | HEART RATE: 70 BPM | BODY MASS INDEX: 30.62 KG/M2 | HEIGHT: 74 IN

## 2019-08-13 DIAGNOSIS — R73.9 HYPERGLYCEMIA: ICD-10-CM

## 2019-08-13 DIAGNOSIS — R94.39 ABNORMAL STRESS TEST: ICD-10-CM

## 2019-08-13 DIAGNOSIS — R42 DIZZINESS: ICD-10-CM

## 2019-08-13 DIAGNOSIS — I10 ESSENTIAL HYPERTENSION: ICD-10-CM

## 2019-08-13 DIAGNOSIS — I51.9 LV DYSFUNCTION: Primary | ICD-10-CM

## 2019-08-13 DIAGNOSIS — E78.00 HYPERCHOLESTEREMIA: ICD-10-CM

## 2019-08-13 DIAGNOSIS — R53.83 OTHER FATIGUE: ICD-10-CM

## 2019-08-13 DIAGNOSIS — R06.02 SHORTNESS OF BREATH: ICD-10-CM

## 2019-08-13 LAB — HBA1C MFR BLD: 5.6 % (ref 4.8–5.6)

## 2019-08-13 PROCEDURE — 36415 COLL VENOUS BLD VENIPUNCTURE: CPT

## 2019-08-13 PROCEDURE — 99214 OFFICE O/P EST MOD 30 MIN: CPT | Performed by: NURSE PRACTITIONER

## 2019-08-13 PROCEDURE — 83036 HEMOGLOBIN GLYCOSYLATED A1C: CPT | Performed by: NURSE PRACTITIONER

## 2019-08-13 RX ORDER — NITROGLYCERIN 0.4 MG/1
TABLET SUBLINGUAL
Qty: 25 TABLET | Refills: 1 | Status: SHIPPED | OUTPATIENT
Start: 2019-08-13 | End: 2021-02-23 | Stop reason: SDUPTHER

## 2019-08-13 NOTE — PROGRESS NOTES
Chief Complaint   Patient presents with   • Follow-up     Cardiac management . Takes Aspirin 81 mg daily . Stress , Echo and labs complete and on chart from 8-6-19 . Had labs recently and is worried about  glucose  , is requesting to have A1C   • Dizziness     And SOA with exertion .       Subjective       Dell Fowler is a 63 y.o. male with hypertension, hypercholesterolemia, and endothelial dysfunction.  Cardiac catheterizations in 2006 and 2016 have shown normal coronaries and EF 50%. He has been managed medically.  In July 2019 he was seen due to issues with dizziness, generalized weakness and chest pain. His blood pressure had been low and Isosorbide stopped. When he stopped the long acting Nitrate he developed chest pressure on two occassions for which he took 1/2 tablet of nitrate along with rest and symptoms resolved. No palpitations noted.  Ranexa was prescribed and cardiac work-up ordered.  On 8/6/2019 he underwent nuclear stress test which showed increased chronotropic response and PVCs.  Inferior septal wall infarct with joshua-infarct ischemia versus diaphragmatic attenuation was noted.  Low-dose beta-blocker was added with recommendation for elective cardiac catheterization should symptoms persist. Echo showed LVEF 41-45%, similar to 2016 echo report.  Carotid ultrasound showed mild plaque with no hemodynamically significant stenosis.    Today he returns to the office for follow-up visit post cardiac work-up.  He continues to have issues with dizziness with position change or with significant exertion.  His wife voices concern over persistent fatigue which she feels has worsened over the past 6 months.  Their home is bilevel requiring him to go up and down steps often throughout the day which he feels causes him to have more shortness of breath. At night he sleeps well and his wife denies him exhibiting any significant signs of sleep apnea. He now takes naps daily due to episodes of feeling extremely  tired and weak.     HPI     Cardiac History:    Past Surgical History:   Procedure Laterality Date   • CARDIAC CATHETERIZATION  11/22/2006    (Dr. Briseno) Normal Coronaries   • CARDIAC CATHETERIZATION  02/29/2016    normal coronaries EF 50%   • CARDIOVASCULAR STRESS TEST  11/21/2006    ([facility] Dr. Briseno) 9 min, 93% THR. EF 48%. Anteroseptal and Inferior Ischemia   • CARDIOVASCULAR STRESS TEST  02/25/2016    6 min 30 sec, 91% THR. EF (s) 24%. (R) 43%. Inferoseptal Ischemia   • CARDIOVASCULAR STRESS TEST  08/06/2019    6 Min. 7.0 METS. 92% THR. BP- 148/81. EF 42%. PVC. Inferoseptal Infarct with periinfarct Ischemia.   • ECHO - CONVERTED  11/2006    ([facility]) Dr. Briseno Normal EF    • ECHO - CONVERTED  02/25/2016    EF 45%. Inferoseptal WMA   • ECHO - CONVERTED  08/06/2019    EF 45%. Inferoseptal WMA. AO- 4.0 Cm       Current Outpatient Medications   Medication Sig Dispense Refill   • aspirin 81 MG EC tablet Take 81 mg by mouth daily.     • atorvastatin (LIPITOR) 10 MG tablet Take 1 tablet by mouth Daily. 90 tablet 3   • carvedilol (COREG) 3.125 MG tablet Take 1 tablet by mouth 2 (Two) Times a Day. 60 tablet 11   • cetirizine (ZyrTEC) 10 MG tablet Take 10 mg by mouth daily.     • finasteride (PROSCAR) 5 MG tablet Take 5 mg by mouth Daily.     • isosorbide dinitrate (ISORDIL) 30 MG tablet Take 30 mg by mouth 2 (Two) Times a Day As Needed. Takes 1/2 tablet PRN     • L-ARGININE MAXIMUM STRENGTH 1000 MG tablet TAKE 1 TABLET BY MOUTH 2 (TWO) TIMES A DAY. 180 each 3   • lisinopril (PRINIVIL,ZESTRIL) 5 MG tablet Take 1 tablet by mouth Daily. 90 tablet 3   • nitroglycerin (NITROSTAT) 0.4 MG SL tablet 1 under the tongue as needed for angina, may repeat q5mins for up three doses 25 tablet 1   • omeprazole (PriLOSEC) 20 MG capsule Take 1 capsule by mouth daily. 90 capsule 3   • ranolazine (RANEXA) 500 MG 12 hr tablet Take 1 tablet by mouth 2 (Two) Times a Day. 60 tablet 6   • tamsulosin (FLOMAX) 0.4 MG capsule 24 hr capsule Take 1  capsule by mouth every night.       No current facility-administered medications for this visit.        Patient has no known allergies.    Past Medical History:   Diagnosis Date   • BPH (benign prostatic hyperplasia)     followed by Dr. Llamas   • GERD (gastroesophageal reflux disease)    • Hyperlipidemia    • Hypertension    • S/P wrist surgery     bilateral surgical repair       Social History     Socioeconomic History   • Marital status:      Spouse name: Not on file   • Number of children: Not on file   • Years of education: Not on file   • Highest education level: Not on file   Tobacco Use   • Smoking status: Former Smoker     Last attempt to quit:      Years since quittin.6   • Smokeless tobacco: Never Used   Substance and Sexual Activity   • Alcohol use: Yes     Comment: occasional mixed drink   • Drug use: No       Family History   Problem Relation Age of Onset   • COPD Mother    • Heart failure Mother    • Heart attack Father         multiple- first in his 50's   • Heart disease Father         cabg in his 60's   • Heart disease Other         stenting at age 65       Review of Systems   Constitution: Positive for weakness (episodes) and malaise/fatigue. Negative for decreased appetite and diaphoresis.   HENT: Negative for hoarse voice and nosebleeds.    Eyes: Negative.    Cardiovascular: Negative for chest pain, leg swelling and near-syncope.   Respiratory: Positive for shortness of breath (gradually worse over last year) and snoring (mild). Negative for cough and sleep disturbances due to breathing.    Endocrine: Negative for polydipsia, polyphagia and polyuria.   Hematologic/Lymphatic: Negative.    Skin: Negative for color change and flushing.   Musculoskeletal: Negative for falls.   Gastrointestinal: Negative.    Genitourinary: Negative.    Neurological: Positive for excessive daytime sleepiness and dizziness (with standing or over exertion).   Psychiatric/Behavioral: Negative for memory  "loss. The patient does not have insomnia and is not nervous/anxious.    Allergic/Immunologic: Negative.         Objective     /64 (BP Location: Left arm)   Pulse 70   Ht 188 cm (74\")   Wt 108 kg (238 lb 9.6 oz)   BMI 30.63 kg/m²     Physical Exam   Constitutional: He is oriented to person, place, and time. He appears well-nourished.   HENT:   Head: Normocephalic.   Eyes: Conjunctivae are normal. Pupils are equal, round, and reactive to light.   Neck: Normal range of motion. Neck supple. Carotid bruit is not present.   Cardiovascular: Normal rate, regular rhythm, S1 normal and S2 normal.   No murmur heard.  Pulmonary/Chest: Breath sounds normal. He has no rales.   Abdominal: Soft. Bowel sounds are normal.   Musculoskeletal: Normal range of motion. He exhibits no edema.   Neurological: He is alert and oriented to person, place, and time.   Skin: Skin is warm and dry.   Psychiatric: He has a normal mood and affect. His behavior is normal.        Procedures: none today         Assessment/Plan      Dell was seen today for follow-up and dizziness.    Diagnoses and all orders for this visit:    LV dysfunction  -     [facility] Cath; Future    Abnormal stress test  -     [facility]; Future    Essential hypertension  -     [facility]; Future    Hypercholesteremia  -     [facility] Cath; Future    Other fatigue  -     [facility]; Future    Shortness of breath  -     [facility]; Future    Dizziness  -     [facility]; Future    Hyperglycemia  -     Hemoglobin A1c; Future    Other orders  -     nitroglycerin (NITROSTAT) 0.4 MG SL tablet; 1 under the tongue as needed for angina, may repeat q5mins for up three doses      Dell presents to the office today to review recent cardiac workup. His echocardiogram shows LVEF 41-45%, similar to last echo in 2016. Stress showed area of inferoseptal infarct with joshua-infarct ischemia versus diaphragmatic attenuation.  " Due to dizziness he also had a carotid ultrasound which did not show significant stenosis.  He is currently taking Ranexa and carvedilol without problem.  He denies chest pain.  He does voice concern over persistent dizziness, increased shortness of breath, and significant fatigue.  He wants to proceed with elective cardiac catheterization.  At this time same cardiac medications advised.  A prescription for Nitrostat and a handout on how to use for chest pain given to him.    His glucose was slightly elevated being 116.  A lab order for hemoglobin A1c provided today.  Further recommendations based on lab results.  I have requested a copy also be sent to you.    Patient's Body mass index is 30.63 kg/m². BMI is above normal parameters. Recommendations include: nutrition counseling.  Dietary handout on nutrition for management of diabetes given to him.     Further recommendations based on cath results.  A hospital follow-up visit will be scheduled.           Electronically signed by OREN Kendrick,  August 13, 2019 10:39 AM

## 2019-08-13 NOTE — PATIENT INSTRUCTIONS
Nitroglycerin sublingual tablets  What is this medicine?  NITROGLYCERIN (diana troe GLI ser in) is a type of vasodilator. It relaxes blood vessels, increasing the blood and oxygen supply to your heart. This medicine is used to relieve chest pain caused by angina. It is also used to prevent chest pain before activities like climbing stairs, going outdoors in cold weather, or sexual activity.  This medicine may be used for other purposes; ask your health care provider or pharmacist if you have questions.  COMMON BRAND NAME(S): Nitroquick, Nitrostat, Nitrotab  What should I tell my health care provider before I take this medicine?  They need to know if you have any of these conditions:  -anemia  -head injury, recent stroke, or bleeding in the brain  -liver disease  -previous heart attack  -an unusual or allergic reaction to nitroglycerin, other medicines, foods, dyes, or preservatives  -pregnant or trying to get pregnant  -breast-feeding  How should I use this medicine?  Take this medicine by mouth as needed. At the first sign of an angina attack (chest pain or tightness) place one tablet under your tongue. You can also take this medicine 5 to 10 minutes before an event likely to produce chest pain. Follow the directions on the prescription label. Let the tablet dissolve under the tongue. Do not swallow whole. Replace the dose if you accidentally swallow it. It will help if your mouth is not dry. Saliva around the tablet will help it to dissolve more quickly. Do not eat or drink, smoke or chew tobacco while a tablet is dissolving. If you are not better within 5 minutes after taking ONE dose of nitroglycerin, call 9-1-1 immediately to seek emergency medical care. Do not take more than 3 nitroglycerin tablets over 15 minutes.  If you take this medicine often to relieve symptoms of angina, your doctor or health care professional may provide you with different instructions to manage your symptoms. If symptoms do not go away  after following these instructions, it is important to call 9-1-1 immediately. Do not take more than 3 nitroglycerin tablets over 15 minutes.  Talk to your pediatrician regarding the use of this medicine in children. Special care may be needed.  Overdosage: If you think you have taken too much of this medicine contact a poison control center or emergency room at once.  NOTE: This medicine is only for you. Do not share this medicine with others.  What if I miss a dose?  This does not apply. This medicine is only used as needed.  What may interact with this medicine?  Do not take this medicine with any of the following medications:  -certain migraine medicines like ergotamine and dihydroergotamine (DHE)  -medicines used to treat erectile dysfunction like sildenafil, tadalafil, and vardenafil  -riociguat  This medicine may also interact with the following medications:  -alteplase  -aspirin  -heparin  -medicines for high blood pressure  -medicines for mental depression  -other medicines used to treat angina  -phenothiazines like chlorpromazine, mesoridazine, prochlorperazine, thioridazine  This list may not describe all possible interactions. Give your health care provider a list of all the medicines, herbs, non-prescription drugs, or dietary supplements you use. Also tell them if you smoke, drink alcohol, or use illegal drugs. Some items may interact with your medicine.  What should I watch for while using this medicine?  Tell your doctor or health care professional if you feel your medicine is no longer working.  Keep this medicine with you at all times. Sit or lie down when you take your medicine to prevent falling if you feel dizzy or faint after using it. Try to remain calm. This will help you to feel better faster. If you feel dizzy, take several deep breaths and lie down with your feet propped up, or bend forward with your head resting between your knees.  You may get drowsy or dizzy. Do not drive, use machinery,  or do anything that needs mental alertness until you know how this drug affects you. Do not stand or sit up quickly, especially if you are an older patient. This reduces the risk of dizzy or fainting spells. Alcohol can make you more drowsy and dizzy. Avoid alcoholic drinks.  Do not treat yourself for coughs, colds, or pain while you are taking this medicine without asking your doctor or health care professional for advice. Some ingredients may increase your blood pressure.  What side effects may I notice from receiving this medicine?  Side effects that you should report to your doctor or health care professional as soon as possible:  -blurred vision  -dry mouth  -skin rash  -sweating  -the feeling of extreme pressure in the head  -unusually weak or tired  Side effects that usually do not require medical attention (report to your doctor or health care professional if they continue or are bothersome):  -flushing of the face or neck  -headache  -irregular heartbeat, palpitations  -nausea, vomiting  This list may not describe all possible side effects. Call your doctor for medical advice about side effects. You may report side effects to FDA at 4-463-FDA-8560.  Where should I keep my medicine?  Keep out of the reach of children.  Store at room temperature between 20 and 25 degrees C (68 and 77 degrees F). Store in original container. Protect from light and moisture. Keep tightly closed. Throw away any unused medicine after the expiration date.  NOTE: This sheet is a summary. It may not cover all possible information. If you have questions about this medicine, talk to your doctor, pharmacist, or health care provider.  © 2019 [employer]vier/[employer] Standard (2014-10-16 17:57:36)    Coronary Angiogram With Stent  Coronary angiogram with stent placement is a procedure to widen or open a narrow blood vessel of the heart (coronary artery). Arteries may become blocked by cholesterol buildup (plaques) in the lining of the wall. When a  coronary artery becomes partially blocked, blood flow to that area decreases. This may lead to chest pain or a heart attack (myocardial infarction).  A stent is a small piece of metal that looks like mesh or a spring. Stent placement may be done as treatment for a heart attack or right after a coronary angiogram in which a blocked artery is found.  Let your health care provider know about:  · Any allergies you have.  · All medicines you are taking, including vitamins, herbs, eye drops, creams, and over-the-counter medicines.  · Any problems you or family members have had with anesthetic medicines.  · Any blood disorders you have.  · Any surgeries you have had.  · Any medical conditions you have.  · Whether you are pregnant or may be pregnant.  What are the risks?  Generally, this is a safe procedure. However, problems may occur, including:  · Damage to the heart or its blood vessels.  · A return of blockage.  · Bleeding, infection, or bruising at the insertion site.  · A collection of blood under the skin (hematoma) at the insertion site.  · A blood clot in another part of the body.  · Kidney injury.  · Allergic reaction to the dye or contrast that is used.  · Bleeding into the abdomen (retroperitoneal bleeding).    What happens before the procedure?  Staying hydrated  Follow instructions from your health care provider about hydration, which may include:  · Up to 2 hours before the procedure - you may continue to drink clear liquids, such as water, clear fruit juice, black coffee, and plain tea.    Eating and drinking restrictions  Follow instructions from your health care provider about eating and drinking, which may include:  · 8 hours before the procedure - stop eating heavy meals or foods such as meat, fried foods, or fatty foods.  · 6 hours before the procedure - stop eating light meals or foods, such as toast or cereal.  · 2 hours before the procedure - stop drinking clear liquids.    Ask your health care  provider about:  · Changing or stopping your regular medicines. This is especially important if you are taking diabetes medicines or blood thinners.  · Taking medicines such as ibuprofen. These medicines can thin your blood. Do not take these medicines before your procedure if your health care provider instructs you not to. Generally, aspirin is recommended before a procedure of passing a small, thin tube (catheter) through a blood vessel and into the heart (cardiac catheterization).    What happens during the procedure?  · An IV tube will be inserted into one of your veins.  · You will be given one or more of the following:  ? A medicine to help you relax (sedative).  ? A medicine to numb the area where the catheter will be inserted into an artery (local anesthetic).  · To reduce your risk of infection:  ? Your health care team will wash or sanitize their hands.  ? Your skin will be washed with soap.  ? Hair may be removed from the area where the catheter will be inserted.  · Using a guide wire, the catheter will be inserted into an artery. The location may be in your groin, in your wrist, or in the fold of your arm (near your elbow).  · A type of X-ray (fluoroscopy) will be used to help guide the catheter to the opening of the arteries in the heart.  · A dye will be injected into the catheter, and X-rays will be taken. The dye will help to show where any narrowing or blockages are located in the arteries.  · A tiny wire will be guided to the blocked spot, and a balloon will be inflated to make the artery wider.  · The stent will be expanded and will crush the plaques into the wall of the vessel. The stent will hold the area open and improve the blood flow. Most stents have a drug coating to reduce the risk of the stent narrowing over time.  · The artery may be made wider using a drill, laser, or other tools to remove plaques.  · When the blood flow is better, the catheter will be removed. The lining of the artery  will grow over the stent, which stays where it was placed.  This procedure may vary among health care providers and hospitals.  What happens after the procedure?  · If the procedure is done through the leg, you will be kept in bed lying flat for about 6 hours. You will be instructed to not bend and not cross your legs.  · The insertion site will be checked frequently.  · The pulse in your foot or wrist will be checked frequently.  · You may have additional blood tests, X-rays, and a test that records the electrical activity of your heart (electrocardiogram, or ECG).  This information is not intended to replace advice given to you by your health care provider. Make sure you discuss any questions you have with your health care provider.  Document Released: 06/23/2004 Document Revised: 08/17/2017 Document Reviewed: 07/23/2017  [employer] Interactive Patient Education © 2019 [employer]    Diabetes Mellitus and Nutrition, Adult  When you have diabetes (diabetes mellitus), it is very important to have healthy eating habits because your blood sugar (glucose) levels are greatly affected by what you eat and drink. Eating healthy foods in the appropriate amounts, at about the same times every day, can help you:  Control your blood glucose.  Lower your risk of heart disease.  Improve your blood pressure.  Reach or maintain a healthy weight.  Every person with diabetes is different, and each person has different needs for a meal plan. Your health care provider may recommend that you work with a diet and nutrition specialist (dietitian) to make a meal plan that is best for you. Your meal plan may vary depending on factors such as:  The calories you need.  The medicines you take.  Your weight.  Your blood glucose, blood pressure, and cholesterol levels.  Your activity level.  Other health conditions you have, such as heart or kidney disease.  How do carbohydrates affect me?  Carbohydrates, also called carbs, affect your blood  "glucose level more than any other type of food. Eating carbs naturally raises the amount of glucose in your blood. Carb counting is a method for keeping track of how many carbs you eat. Counting carbs is important to keep your blood glucose at a healthy level, especially if you use insulin or take certain oral diabetes medicines.  It is important to know how many carbs you can safely have in each meal. This is different for every person. Your dietitian can help you calculate how many carbs you should have at each meal and for each snack.  Foods that contain carbs include:  Bread, cereal, rice, pasta, and crackers.  Potatoes and corn.  Peas, beans, and lentils.  Milk and yogurt.  Fruit and juice.  Desserts, such as cakes, cookies, ice cream, and candy.  How does alcohol affect me?  Alcohol can cause a sudden decrease in blood glucose (hypoglycemia), especially if you use insulin or take certain oral diabetes medicines. Hypoglycemia can be a life-threatening condition. Symptoms of hypoglycemia (sleepiness, dizziness, and confusion) are similar to symptoms of having too much alcohol.  If your health care provider says that alcohol is safe for you, follow these guidelines:  Limit alcohol intake to no more than 1 drink per day for nonpregnant women and 2 drinks per day for men. One drink equals 12 oz of beer, 5 oz of wine, or 1½ oz of hard liquor.  Do not drink on an empty stomach.  Keep yourself hydrated with water, diet soda, or unsweetened iced tea.  Keep in mind that regular soda, juice, and other mixers may contain a lot of sugar and must be counted as carbs.  What are tips for following this plan?    Reading food labels  Start by checking the serving size on the \"Nutrition Facts\" label of packaged foods and drinks. The amount of calories, carbs, fats, and other nutrients listed on the label is based on one serving of the item. Many items contain more than one serving per package.  Check the total grams (g) of carbs " "in one serving. You can calculate the number of servings of carbs in one serving by dividing the total carbs by 15. For example, if a food has 30 g of total carbs, it would be equal to 2 servings of carbs.  Check the number of grams (g) of saturated and trans fats in one serving. Choose foods that have low or no amount of these fats.  Check the number of milligrams (mg) of salt (sodium) in one serving. Most people should limit total sodium intake to less than 2,300 mg per day.  Always check the nutrition information of foods labeled as \"low-fat\" or \"nonfat\". These foods may be higher in added sugar or refined carbs and should be avoided.  Talk to your dietitian to identify your daily goals for nutrients listed on the label.  Shopping  Avoid buying canned, premade, or processed foods. These foods tend to be high in fat, sodium, and added sugar.  Shop around the outside edge of the grocery store. This includes fresh fruits and vegetables, bulk grains, fresh meats, and fresh dairy.  Cooking  Use low-heat cooking methods, such as baking, instead of high-heat cooking methods like deep frying.  Cook using healthy oils, such as olive, canola, or sunflower oil.  Avoid cooking with butter, cream, or high-fat meats.  Meal planning  Eat meals and snacks regularly, preferably at the same times every day. Avoid going long periods of time without eating.  Eat foods high in fiber, such as fresh fruits, vegetables, beans, and whole grains. Talk to your dietitian about how many servings of carbs you can eat at each meal.  Eat 4-6 ounces (oz) of lean protein each day, such as lean meat, chicken, fish, eggs, or tofu. One oz of lean protein is equal to:  1 oz of meat, chicken, or fish.  1 egg.  ¼ cup of tofu.  Eat some foods each day that contain healthy fats, such as avocado, nuts, seeds, and fish.  Lifestyle  Check your blood glucose regularly.  Exercise regularly as told by your health care provider. This may include:  150 minutes " of moderate-intensity or vigorous-intensity exercise each week. This could be brisk walking, biking, or water aerobics.  Stretching and doing strength exercises, such as yoga or weightlifting, at least 2 times a week.  Take medicines as told by your health care provider.  Do not use any products that contain nicotine or tobacco, such as cigarettes and e-cigarettes. If you need help quitting, ask your health care provider.  Work with a counselor or diabetes educator to identify strategies to manage stress and any emotional and social challenges.  Questions to ask a health care provider  Do I need to meet with a diabetes educator?  Do I need to meet with a dietitian?  What number can I call if I have questions?  When are the best times to check my blood glucose?  Where to find more information:  American Diabetes Association: diabetes.org  Academy of Nutrition and Dietetics: www.eatright.org  National [place] of Diabetes and Digestive and Kidney Diseases (NIH): www.niddk.nih.gov  Summary  A healthy meal plan will help you control your blood glucose and maintain a healthy lifestyle.  Working with a diet and nutrition specialist (dietitian) can help you make a meal plan that is best for you.  Keep in mind that carbohydrates (carbs) and alcohol have immediate effects on your blood glucose levels. It is important to count carbs and to use alcohol carefully.  This information is not intended to replace advice given to you by your health care provider. Make sure you discuss any questions you have with your health care provider.  Document Released: 09/14/2006 Document Revised: 07/18/2018 Document Reviewed: 01/22/2018  [employer] Interactive Patient Education © 2019 [employer]    Hemoglobin A1c Test  Why am I having this test?  You may have the hemoglobin A1c test (HbA1c test) done to:  · Evaluate your risk for developing diabetes (diabetes mellitus).  · Diagnose diabetes.  · Monitor long-term control of blood sugar  (glucose) in people who have diabetes and help make treatment decisions.  This test may be done with other blood glucose tests, such as fasting blood glucose and oral glucose tolerance tests.  What is being tested?  Hemoglobin is a type of protein in the blood that carries oxygen. Glucose attaches to hemoglobin to form glycated hemoglobin. This test checks the amount of glycated hemoglobin in your blood, which is a good indicator of the average amount of glucose in your blood during the past 2-3 months.  What kind of sample is taken?    A blood sample is required for this test. It is usually collected by inserting a needle into a blood vessel.  Tell a health care provider about:  · All medicines you are taking, including vitamins, herbs, eye drops, creams, and over-the-counter medicines.  · Any blood disorders you have.  · Any surgeries you have had.  · Any medical conditions you have.  · Whether you are pregnant or may be pregnant.  How are the results reported?  Your results will be reported as a percentage that indicates how much of your hemoglobin has glucose attached to it (is glycated). Your health care provider will compare your results to normal ranges that were established after testing a large group of people (reference ranges). Reference ranges may vary among labs and hospitals. For this test, common reference ranges are:  · Adult or child without diabetes: 4-5.6%.  · Adult or child with diabetes and good blood glucose control: less than 7%.  What do the results mean?  If you have diabetes:  · A result of less than 7% is considered normal, meaning that your blood glucose is well controlled.  · A result higher than 7% means that your blood glucose is not well controlled, and your treatment plan may need to be adjusted.  If you do not have diabetes:  · A result within the reference range is considered normal, meaning that you are not at high risk for diabetes.  · A result of 5.7-6.4% means that you have a  high risk of developing diabetes, and you may have prediabetes. Prediabetes is the condition of having a blood glucose level that is higher than it should be, but not high enough for you to be diagnosed with diabetes. Having prediabetes puts you at risk for developing type 2 diabetes (type 2 diabetes mellitus). You may have more tests, including a repeat HbA1c test.  · Results of 6.5% or higher on two separate HbA1c tests mean that you have diabetes. You may have more tests to confirm the diagnosis.  Abnormally low HbA1c values may be caused by:  · Pregnancy.  · Severe blood loss.  · Receiving donated blood (transfusions).  · Low red blood cell count (anemia).  · Long-term kidney failure.  · Some unusual forms (variants) of hemoglobin.  Talk with your health care provider about what your results mean.  Questions to ask your health care provider  Ask your health care provider, or the department that is doing the test:  · When will my results be ready?  · How will I get my results?  · What are my treatment options?  · What other tests do I need?  · What are my next steps?  Summary  · The hemoglobin A1c test (HbA1c test) may be done to evaluate your risk for developing diabetes, to diagnose diabetes, and to monitor long-term control of blood sugar (glucose) in people who have diabetes and help make treatment decisions.  · Hemoglobin is a type of protein in the blood that carries oxygen. Glucose attaches to hemoglobin to form glycated hemoglobin. This test checks the amount of glycated hemoglobin in your blood, which is a good indicator of the average amount of glucose in your blood during the past 2-3 months.  · Talk with your health care provider about what your results mean.  This information is not intended to replace advice given to you by your health care provider. Make sure you discuss any questions you have with your health care provider.  Document Released: 01/09/2006 Document Revised: 07/31/2018 Document  Reviewed: 07/31/2018  [employer] Interactive Patient Education © 2019 [employer]

## 2019-08-14 NOTE — TELEPHONE ENCOUNTER
Cardiac cath scheduled 8/22/19.  See [facility] cath referral for further communications.

## 2019-08-22 ENCOUNTER — OUTSIDE FACILITY SERVICE (OUTPATIENT)
Dept: CARDIOLOGY | Facility: CLINIC | Age: 64
End: 2019-08-22

## 2019-08-22 PROCEDURE — 93458 L HRT ARTERY/VENTRICLE ANGIO: CPT | Performed by: INTERNAL MEDICINE

## 2019-08-23 ENCOUNTER — TELEPHONE (OUTPATIENT)
Dept: CARDIOLOGY | Facility: CLINIC | Age: 64
End: 2019-08-23

## 2019-10-31 ENCOUNTER — OFFICE VISIT (OUTPATIENT)
Dept: CARDIOLOGY | Facility: CLINIC | Age: 64
End: 2019-10-31

## 2019-10-31 VITALS
HEART RATE: 70 BPM | SYSTOLIC BLOOD PRESSURE: 100 MMHG | DIASTOLIC BLOOD PRESSURE: 60 MMHG | WEIGHT: 237 LBS | HEIGHT: 74 IN | BODY MASS INDEX: 30.42 KG/M2

## 2019-10-31 DIAGNOSIS — I10 ESSENTIAL HYPERTENSION: ICD-10-CM

## 2019-10-31 DIAGNOSIS — Z79.899 MEDICATION MANAGEMENT: ICD-10-CM

## 2019-10-31 DIAGNOSIS — I25.10 ENDOTHELIAL DYSFUNCTION OF CORONARY ARTERY: Primary | ICD-10-CM

## 2019-10-31 DIAGNOSIS — E78.00 HYPERCHOLESTEREMIA: ICD-10-CM

## 2019-10-31 PROCEDURE — 99213 OFFICE O/P EST LOW 20 MIN: CPT | Performed by: NURSE PRACTITIONER

## 2019-10-31 RX ORDER — LISINOPRIL 5 MG/1
5 TABLET ORAL DAILY
Qty: 90 TABLET | Refills: 3 | Status: SHIPPED | OUTPATIENT
Start: 2019-10-31 | End: 2019-10-31

## 2019-10-31 RX ORDER — LISINOPRIL 5 MG/1
TABLET ORAL
Qty: 90 TABLET | Refills: 3
Start: 2019-10-31 | End: 2020-06-30 | Stop reason: SDUPTHER

## 2019-10-31 RX ORDER — ATORVASTATIN CALCIUM 10 MG/1
10 TABLET, FILM COATED ORAL DAILY
Qty: 90 TABLET | Refills: 3 | Status: SHIPPED | OUTPATIENT
Start: 2019-10-31 | End: 2020-06-30 | Stop reason: SDUPTHER

## 2019-10-31 RX ORDER — RANOLAZINE 500 MG/1
500 TABLET, EXTENDED RELEASE ORAL 2 TIMES DAILY
Qty: 180 TABLET | Refills: 3 | Status: SHIPPED | OUTPATIENT
Start: 2019-10-31 | End: 2020-06-30 | Stop reason: SDUPTHER

## 2019-10-31 RX ORDER — CARVEDILOL 3.12 MG/1
3.12 TABLET ORAL 2 TIMES DAILY
Qty: 180 TABLET | Refills: 3 | Status: SHIPPED | OUTPATIENT
Start: 2019-10-31 | End: 2020-06-30 | Stop reason: SDUPTHER

## 2019-11-01 ENCOUNTER — TELEPHONE (OUTPATIENT)
Dept: CARDIOLOGY | Facility: CLINIC | Age: 64
End: 2019-11-01

## 2019-11-01 NOTE — TELEPHONE ENCOUNTER
Received fax from [employer] requesting clarification on Lisinopril script. Phoned [facility] spoke with Verito, Verito made aware of new script 10/31/19 for Lisinopril 5mg 1/2 tablet daily, 90 day with 3 refills per Alpa Yen APRN.

## 2019-11-13 ENCOUNTER — TELEPHONE (OUTPATIENT)
Dept: CARDIOLOGY | Facility: CLINIC | Age: 64
End: 2019-11-13

## 2019-11-13 NOTE — TELEPHONE ENCOUNTER
Patient called in and asked if he is to continue taking Singulair 10 mg at HS ? He was given script on 8-22-19 post cardiac cath.

## 2019-11-14 RX ORDER — MONTELUKAST SODIUM 10 MG/1
10 TABLET ORAL NIGHTLY
Qty: 90 TABLET | Refills: 3 | Status: SHIPPED | OUTPATIENT
Start: 2019-11-14 | End: 2020-06-30 | Stop reason: SDUPTHER

## 2020-06-30 ENCOUNTER — OFFICE VISIT (OUTPATIENT)
Dept: CARDIOLOGY | Facility: CLINIC | Age: 65
End: 2020-06-30

## 2020-06-30 VITALS
HEART RATE: 74 BPM | HEIGHT: 74 IN | TEMPERATURE: 98.4 F | DIASTOLIC BLOOD PRESSURE: 80 MMHG | WEIGHT: 238.4 LBS | SYSTOLIC BLOOD PRESSURE: 118 MMHG | BODY MASS INDEX: 30.6 KG/M2

## 2020-06-30 DIAGNOSIS — J30.9 ALLERGIC RHINITIS, UNSPECIFIED SEASONALITY, UNSPECIFIED TRIGGER: ICD-10-CM

## 2020-06-30 DIAGNOSIS — I25.10 ENDOTHELIAL DYSFUNCTION OF CORONARY ARTERY: ICD-10-CM

## 2020-06-30 DIAGNOSIS — I10 ESSENTIAL HYPERTENSION: ICD-10-CM

## 2020-06-30 DIAGNOSIS — E78.00 HYPERCHOLESTEREMIA: ICD-10-CM

## 2020-06-30 DIAGNOSIS — Z79.899 MEDICATION MANAGEMENT: ICD-10-CM

## 2020-06-30 PROCEDURE — 99214 OFFICE O/P EST MOD 30 MIN: CPT | Performed by: NURSE PRACTITIONER

## 2020-06-30 RX ORDER — CARVEDILOL 3.12 MG/1
3.12 TABLET ORAL 2 TIMES DAILY
Qty: 180 TABLET | Refills: 3 | Status: SHIPPED | OUTPATIENT
Start: 2020-06-30 | End: 2020-10-12

## 2020-06-30 RX ORDER — LISINOPRIL 5 MG/1
TABLET ORAL
Qty: 90 TABLET | Refills: 3
Start: 2020-06-30 | End: 2020-10-26

## 2020-06-30 RX ORDER — ATORVASTATIN CALCIUM 10 MG/1
10 TABLET, FILM COATED ORAL DAILY
Qty: 90 TABLET | Refills: 3 | Status: SHIPPED | OUTPATIENT
Start: 2020-06-30 | End: 2020-11-25

## 2020-06-30 RX ORDER — RANOLAZINE 500 MG/1
500 TABLET, EXTENDED RELEASE ORAL 2 TIMES DAILY
Qty: 180 TABLET | Refills: 3 | Status: SHIPPED | OUTPATIENT
Start: 2020-06-30 | End: 2021-05-20

## 2020-06-30 RX ORDER — MONTELUKAST SODIUM 10 MG/1
TABLET ORAL
Qty: 90 TABLET | Refills: 3 | Status: SHIPPED | OUTPATIENT
Start: 2020-06-30 | End: 2021-02-23 | Stop reason: SDUPTHER

## 2020-06-30 NOTE — PROGRESS NOTES
Chief Complaint   Patient presents with   • Follow-up     cardiac management . No current labs on chart . Has no cardiac complaints today .Has had Appendectomy and  and Kidney stones since last visit   • Med Refill     Needs refills on cardiac and cholesterol meds .90 day supply . Had medication list today.       Subjective       Dell Fowler is a 64 y.o. male  with hypertension, hypercholesterolemia, and endothelial dysfunction.  Cardiac catheterizations in 2006 and 2016 have shown normal coronaries and EF 50%. He has been managed medically.  In July 2019 he was seen due to issues with dizziness, generalized weakness and chest pain. His blood pressure had been low and Isosorbide stopped. When he stopped the long acting Nitrate he developed chest pressure on two occassions for which he took 1/2 tablet of nitrate along with rest and symptoms resolved. No palpitations noted.  Ranexa was prescribed and cardiac work-up ordered.  On 8/6/2019 he underwent nuclear stress test which showed increased chronotropic response and PVCs.  Inferior septal wall infarct with joshua-infarct ischemia versus diaphragmatic attenuation was noted.  Low-dose beta-blocker was added with recommendation for elective cardiac catheterization should symptoms persist. Echo showed LVEF 41-45%, similar to 2016 echo report.  Carotid ultrasound showed mild plaque with no hemodynamically significant stenosis.  He was seen 8/13/2019 and voiced concern over persistent fatigue and episodes of dizziness.  It was decided he would proceed with elective cardiac catheterization. On 8/22/2019 cardiac catheterization showed normal coronary arteries with recommendation to continue medical management.  The dose of Ranexa was increased and his symptoms significantly improved.    Today he comes the office for follow-up visit.  Since his last visit he underwent appendectomy.  He developed problems with kidney stones for which he underwent procedure for removal.  He  denies cardiac concerns or symptoms.  He has had no chest pain or palpitations.  Recently he resumed exercising and was able to jog on treadmill without issues.    HPI     Cardiac History:    Past Surgical History:   Procedure Laterality Date   • CARDIAC CATHETERIZATION  11/22/2006    (Dr. Briseno) Normal Coronaries   • CARDIAC CATHETERIZATION  02/29/2016    normal coronaries EF 50%   • CARDIAC CATHETERIZATION  08/22/2019    Normal Coronaries   • CARDIOVASCULAR STRESS TEST  11/21/2006    ([facility] Dr. Briseno) 9 min, 93% THR. EF 48%. Anteroseptal and Inferior Ischemia   • CARDIOVASCULAR STRESS TEST  02/25/2016    6 min 30 sec, 91% THR. EF (s) 24%. (R) 43%. Inferoseptal Ischemia   • CARDIOVASCULAR STRESS TEST  08/06/2019    6 Min. 7.0 METS. 92% THR. BP- 148/81. EF 42%. PVC. Inferoseptal Infarct with periinfarct Ischemia.   • ECHO - CONVERTED  11/2006    ([facility]) Dr. Briseno Normal EF    • ECHO - CONVERTED  02/25/2016    EF 45%. Inferoseptal WMA   • ECHO - CONVERTED  08/06/2019    EF 45%. Inferoseptal WMA. AO- 4.0 Cm       Current Outpatient Medications   Medication Sig Dispense Refill   • aspirin 81 MG EC tablet Take 81 mg by mouth daily.     • atorvastatin (LIPITOR) 10 MG tablet Take 1 tablet by mouth Daily. 90 tablet 3   • carvedilol (COREG) 3.125 MG tablet Take 1 tablet by mouth 2 (Two) Times a Day. 180 tablet 3   • cetirizine (ZyrTEC) 10 MG tablet Take 10 mg by mouth daily.     • finasteride (PROSCAR) 5 MG tablet Take 5 mg by mouth Daily.     • L-ARGININE MAXIMUM STRENGTH 1000 MG tablet TAKE 1 TABLET BY MOUTH 2 (TWO) TIMES A DAY. 180 each 3   • lisinopril (PRINIVIL,ZESTRIL) 5 MG tablet Decrease 1/2 tablet daily 90 tablet 3   • montelukast (SINGULAIR) 10 MG tablet At night if needed for allergy type symptoms 90 tablet 3   • omeprazole (PriLOSEC) 20 MG capsule Take 1 capsule by mouth daily. 90 capsule 3   • ranolazine (Ranexa) 500 MG 12 hr tablet Take 1 tablet by mouth 2 (Two) Times a Day. 180 tablet 3   • tamsulosin  "(FLOMAX) 0.4 MG capsule 24 hr capsule Take 1 capsule by mouth every night.     • nitroglycerin (NITROSTAT) 0.4 MG SL tablet 1 under the tongue as needed for angina, may repeat q5mins for up three doses 25 tablet 1     No current facility-administered medications for this visit.        Patient has no known allergies.    Past Medical History:   Diagnosis Date   • BPH (benign prostatic hyperplasia)     followed by Dr. Llamas   • GERD (gastroesophageal reflux disease)    • Hyperlipidemia    • Hypertension    • S/P wrist surgery     bilateral surgical repair       Social History     Socioeconomic History   • Marital status:      Spouse name: Not on file   • Number of children: Not on file   • Years of education: Not on file   • Highest education level: Not on file   Tobacco Use   • Smoking status: Former Smoker     Last attempt to quit:      Years since quittin.5   • Smokeless tobacco: Never Used   Substance and Sexual Activity   • Alcohol use: Yes     Comment: occasional mixed drink   • Drug use: No       Family History   Problem Relation Age of Onset   • COPD Mother    • Heart failure Mother    • Heart attack Father         multiple- first in his 50's   • Heart disease Father         cabg in his 60's   • Heart disease Other         stenting at age 65       Review of Systems   Constitution: Negative for decreased appetite and malaise/fatigue.   HENT: Positive for congestion (mild nasal and sinus at times, \"pill seems to help\"). Negative for hoarse voice, nosebleeds and sore throat.    Eyes: Negative for blurred vision.   Cardiovascular: Negative for chest pain, leg swelling, near-syncope and palpitations.   Respiratory: Negative for cough and shortness of breath.    Endocrine: Negative for cold intolerance and heat intolerance.   Hematologic/Lymphatic: Negative for adenopathy. Does not bruise/bleed easily.   Skin: Negative for color change, dry skin and itching.   Musculoskeletal: Positive for arthritis " "and joint pain. Negative for muscle cramps and myalgias.   Gastrointestinal: Negative for abdominal pain, change in bowel habit, dysphagia, heartburn, melena and nausea.   Genitourinary: Negative for dysuria and hematuria.        Has had kidney stones.  Currently no urinary tract symptoms.   Neurological: Negative for dizziness and light-headedness.   Psychiatric/Behavioral: Negative for altered mental status. The patient does not have insomnia.    Allergic/Immunologic: Positive for environmental allergies. Negative for hives and persistent infections.        Objective     /80 (BP Location: Left arm)   Pulse 74   Temp 98.4 °F (36.9 °C)   Ht 188 cm (74\")   Wt 108 kg (238 lb 6.4 oz)   BMI 30.61 kg/m²     Physical Exam   Constitutional: He is oriented to person, place, and time. He appears well-nourished.   HENT:   Head: Normocephalic.   Eyes: Pupils are equal, round, and reactive to light. Conjunctivae are normal.   Neck: Normal range of motion. Neck supple. Carotid bruit is not present.   Cardiovascular: Normal rate, regular rhythm, S1 normal and S2 normal.   No murmur heard.  Pulmonary/Chest: Effort normal and breath sounds normal.   Abdominal: Soft. Bowel sounds are normal.   Musculoskeletal: Normal range of motion. He exhibits no edema.   Neurological: He is alert and oriented to person, place, and time.   Skin: Skin is warm.   Psychiatric: He has a normal mood and affect. His behavior is normal.        Procedures: none today         Assessment/Plan      Dell was seen today for follow-up and med refill.    Diagnoses and all orders for this visit:    Endothelial dysfunction of coronary artery  -     ranolazine (Ranexa) 500 MG 12 hr tablet; Take 1 tablet by mouth 2 (Two) Times a Day.    Essential hypertension  -     carvedilol (COREG) 3.125 MG tablet; Take 1 tablet by mouth 2 (Two) Times a Day.  -     lisinopril (PRINIVIL,ZESTRIL) 5 MG tablet; Decrease 1/2 tablet daily    Hypercholesteremia  -     " atorvastatin (LIPITOR) 10 MG tablet; Take 1 tablet by mouth Daily.    Medication management    Allergic rhinitis, unspecified seasonality, unspecified trigger  -     montelukast (SINGULAIR) 10 MG tablet; At night if needed for allergy type symptoms      Patient presents today without cardiac complaints or concerns.  He is taking Ranexa without side effects noted.  Most recent cardiac work-up showed normal coronaries.  He is being managed medically for endothelial dysfunction.  No repeat cardiac testing advised at this time.    His blood pressure, heart rate, heart rhythm today are normal.  Continue same dose carvedilol and lisinopril.  Medication refills faxed to his pharmacy.    For cholesterol management he is on Lipitor without side effects noted.  If not done sooner we will plan to repeat labs next visit.    Patient's Body mass index is 30.61 kg/m². BMI is above normal parameters. Recommendations include: nutrition counseling.  Information on Mediterranean diet and benefit of exercise given to him.     Dell Riverachanning  reports that he quit smoking about 44 years ago. He has never used smokeless tobacco.     He reports improvement of allergic rhinitis symptoms since addition of Singulair.  He asked if he needed to take routinely.  Advised to change to as needed status.  Prescription given.     A 6-month follow-up visit scheduled.  Please call sooner for any cardiac concerns.             Electronically signed by OREN Kendrick,  June 30, 2020 23:34

## 2020-06-30 NOTE — PATIENT INSTRUCTIONS
Mediterranean Diet  A Mediterranean diet refers to food and lifestyle choices that are based on the traditions of countries located on the [place]. This way of eating has been shown to help prevent certain conditions and improve outcomes for people who have chronic diseases, like kidney disease and heart disease.  What are tips for following this plan?  Lifestyle  · Cook and eat meals together with your family, when possible.  · Drink enough fluid to keep your urine clear or pale yellow.  · Be physically active every day. This includes:  ? Aerobic exercise like running or swimming.  ? Leisure activities like gardening, walking, or housework.  · Get 7-8 hours of sleep each night.  · If recommended by your health care provider, drink red wine in moderation. This means 1 glass a day for nonpregnant women and 2 glasses a day for men. A glass of wine equals 5 oz (150 mL).  Reading food labels    · Check the serving size of packaged foods. For foods such as rice and pasta, the serving size refers to the amount of cooked product, not dry.  · Check the total fat in packaged foods. Avoid foods that have saturated fat or trans fats.  · Check the ingredients list for added sugars, such as corn syrup.  Shopping  · At the grocery store, buy most of your food from the areas near the walls of the store. This includes:  ? Fresh fruits and vegetables (produce).  ? Grains, beans, nuts, and seeds. Some of these may be available in unpackaged forms or large amounts (in bulk).  ? Fresh seafood.  ? Poultry and eggs.  ? Low-fat dairy products.  · Buy whole ingredients instead of prepackaged foods.  · Buy fresh fruits and vegetables in-season from local farmers markets.  · Buy frozen fruits and vegetables in resealable bags.  · If you do not have access to quality fresh seafood, buy precooked frozen shrimp or canned fish, such as tuna, salmon, or sardines.  · Buy small amounts of raw or cooked vegetables, salads, or olives from  the deli or salad bar at your store.  · Stock your pantry so you always have certain foods on hand, such as olive oil, canned tuna, canned tomatoes, rice, pasta, and beans.  Cooking  · Cook foods with extra-virgin olive oil instead of using butter or other vegetable oils.  · Have meat as a side dish, and have vegetables or grains as your main dish. This means having meat in small portions or adding small amounts of meat to foods like pasta or stew.  · Use beans or vegetables instead of meat in common dishes like chili or lasagna.  · [place] with different cooking methods. Try roasting or broiling vegetables instead of steaming or sautéeing them.  · Add frozen vegetables to soups, stews, pasta, or rice.  · Add nuts or seeds for added healthy fat at each meal. You can add these to yogurt, salads, or vegetable dishes.  · Marinate fish or vegetables using olive oil, lemon juice, garlic, and fresh herbs.  Meal planning    · Plan to eat 1 vegetarian meal one day each week. Try to work up to 2 vegetarian meals, if possible.  · Eat seafood 2 or more times a week.  · Have healthy snacks readily available, such as:  ? Vegetable sticks with hummus.  ? Greek yogurt.  ? Fruit and nut trail mix.  · Eat balanced meals throughout the week. This includes:  ? Fruit: 2-3 servings a day  ? Vegetables: 4-5 servings a day  ? Low-fat dairy: 2 servings a day  ? Fish, poultry, or lean meat: 1 serving a day  ? Beans and legumes: 2 or more servings a week  ? Nuts and seeds: 1-2 servings a day  ? Whole grains: 6-8 servings a day  ? Extra-virgin olive oil: 3-4 servings a day  · Limit red meat and sweets to only a few servings a month  What are my food choices?  · Mediterranean diet  ? Recommended  § Grains: Whole-grain pasta. Brown rice. Bulgar wheat. Polenta. Couscous. Whole-wheat bread. Oatmeal. Quinoa.  § Vegetables: Artichokes. Beets. Broccoli. Cabbage. Carrots. Eggplant. Green beans. Chard. Kale. Spinach. Onions. Leeks. Peas. Squash.  Tomatoes. Peppers. Radishes.  § Fruits: Apples. Apricots. Avocado. Berries. Bananas. Cherries. Dates. Figs. Grapes. Maya. Melon. Oranges. Peaches. Plums. Pomegranate.  § Meats and other protein foods: Beans. Almonds. Sunflower seeds. Pine nuts. Peanuts. Cod. [place]. Scallops. Shrimp. Tuna. Tilapia. Clams. Oysters. Eggs.  § Dairy: Low-fat milk. Cheese. Greek yogurt.  § Beverages: Water. Red wine. Herbal tea.  § Fats and oils: Extra virgin olive oil. Avocado oil. Grape seed oil.  § Sweets and desserts: Greek yogurt with honey. Baked apples. Poached pears. Trail mix.  § Seasoning and other foods: Basil. Cilantro. Coriander. Cumin. Mint. Parsley. [place]. Rosemary. Tarragon. Garlic. Oregano. Thyme. Pepper. Balsalmic vinegar. Tahini. Hummus. Tomato sauce. Olives. Mushrooms.  ? Limit these  § Grains: Prepackaged pasta or rice dishes. Prepackaged cereal with added sugar.  § Vegetables: Deep fried potatoes (french fries).  § Fruits: Fruit canned in syrup.  § Meats and other protein foods: Beef. Pork. Lamb. Poultry with skin. Hot dogs. [place].  § Dairy: Ice cream. Sour cream. Whole milk.  § Beverages: Juice. Sugar-sweetened soft drinks. Beer. Liquor and spirits.  § Fats and oils: Butter. Canola oil. Vegetable oil. Beef fat (tallow). Lard.  § Sweets and desserts: Cookies. Cakes. Pies. Candy.  § Seasoning and other foods: Mayonnaise. Premade sauces and marinades.  The items listed may not be a complete list. Talk with your dietitian about what dietary choices are right for you.  Summary  · The Mediterranean diet includes both food and lifestyle choices.  · Eat a variety of fresh fruits and vegetables, beans, nuts, seeds, and whole grains.  · Limit the amount of red meat and sweets that you eat.  · Talk with your health care provider about whether it is safe for you to drink red wine in moderation. This means 1 glass a day for nonpregnant women and 2 glasses a day for men. A glass of wine equals 5 oz (150 mL).  This information  "is not intended to replace advice given to you by your health care provider. Make sure you discuss any questions you have with your health care provider.  Document Released: 08/10/2017 Document Revised: 08/17/2017 Document Reviewed: 08/10/2017  Elsevier Patient Education © 2020 [employer]    BMI for Adults    Body mass index (BMI) is a number that is calculated from a person's weight and height. BMI may help to estimate how much of a person's weight is composed of fat. BMI can help identify those who may be at higher risk for certain medical problems.  How is BMI used with adults?  BMI is used as a screening tool to identify possible weight problems. It is used to check whether a person is obese, overweight, healthy weight, or underweight.  How is BMI calculated?  BMI measures your weight and compares it to your height. This can be done either in English ([place] or metric measurements. Note that charts are available to help you find your BMI quickly and easily without having to do these calculations yourself.  To calculate your BMI in English ([place] measurements, your health care provider will:  1. Measure your weight in pounds (lb).  2. Multiply the number of pounds by 703.  ? For example, for a person who weighs 180 lb, multiply that number by 703, which equals 126,540.  3. Measure your height in inches (in). Then multiply that number by itself to get a measurement called \"inches squared.\"  ? For example, for a person who is 70 in tall, the \"inches squared\" measurement is 70 in x 70 in, which equals 4900 inches squared.  4. Divide the total from Step 2 (number of lb x 703) by the total from Step 3 (inches squared): 126,540 ÷ 4900 = 25.8. This is your BMI.  To calculate your BMI in metric measurements, your health care provider will:  1. Measure your weight in kilograms (kg).  2. Measure your height in meters (m). Then multiply that number by itself to get a measurement called \"meters squared.\"  ? For example, " "for a person who is 1.75 m tall, the \"meters squared\" measurement is 1.75 m x 1.75 m, which is equal to 3.1 meters squared.  3. Divide the number of kilograms (your weight) by the meters squared number. In this example: 70 ÷ 3.1 = 22.6. This is your BMI.  How is BMI interpreted?  To interpret your results, your health care provider will use BMI charts to identify whether you are underweight, normal weight, overweight, or obese. The following guidelines will be used:  · Underweight: BMI less than 18.5.  · Normal weight: BMI between 18.5 and 24.9.  · Overweight: BMI between 25 and 29.9.  · Obese: BMI of 30 and above.  Please note:  · Weight includes both fat and muscle, so someone with a muscular build, such as an athlete, may have a BMI that is higher than 24.9. In cases like these, BMI is not an accurate measure of body fat.  · To determine if excess body fat is the cause of a BMI of 25 or higher, further assessments may need to be done by a health care provider.  · BMI is usually interpreted in the same way for men and women.  Why is BMI a useful tool?  BMI is useful in two ways:  · Identifying a weight problem that may be related to a medical condition, or that may increase the risk for medical problems.  · Promoting lifestyle and diet changes in order to reach a healthy weight.  Summary  · Body mass index (BMI) is a number that is calculated from a person's weight and height.  · BMI may help to estimate how much of a person's weight is composed of fat. BMI can help identify those who may be at higher risk for certain medical problems.  · BMI can be measured using English measurements or metric measurements.  · To interpret your results, your health care provider will use BMI charts to identify whether you are underweight, normal weight, overweight, or obese.  This information is not intended to replace advice given to you by your health care provider. Make sure you discuss any questions you have with your health " care provider.  Document Released: 08/29/2005 Document Revised: 11/30/2018 Document Reviewed: 10/31/2018  Elsevier Patient Education © 2020 [employer]

## 2020-10-08 DIAGNOSIS — I10 ESSENTIAL HYPERTENSION: ICD-10-CM

## 2020-10-13 RX ORDER — CARVEDILOL 3.12 MG/1
TABLET ORAL
Qty: 180 TABLET | Refills: 2 | Status: SHIPPED | OUTPATIENT
Start: 2020-10-13 | End: 2021-07-06

## 2020-10-21 DIAGNOSIS — J30.9 ALLERGIC RHINITIS, UNSPECIFIED SEASONALITY, UNSPECIFIED TRIGGER: ICD-10-CM

## 2020-10-21 RX ORDER — MONTELUKAST SODIUM 10 MG/1
TABLET ORAL
Qty: 90 TABLET | Refills: 3 | OUTPATIENT
Start: 2020-10-21

## 2020-10-26 DIAGNOSIS — I10 ESSENTIAL HYPERTENSION: ICD-10-CM

## 2020-10-26 RX ORDER — LISINOPRIL 5 MG/1
TABLET ORAL
Qty: 45 TABLET | Refills: 3 | Status: SHIPPED | OUTPATIENT
Start: 2020-10-26 | End: 2021-10-22

## 2020-11-24 DIAGNOSIS — E78.00 HYPERCHOLESTEREMIA: ICD-10-CM

## 2020-11-25 RX ORDER — ATORVASTATIN CALCIUM 10 MG/1
TABLET, FILM COATED ORAL
Qty: 90 TABLET | Refills: 3 | Status: SHIPPED | OUTPATIENT
Start: 2020-11-25 | End: 2021-10-28

## 2021-02-23 ENCOUNTER — OFFICE VISIT (OUTPATIENT)
Dept: CARDIOLOGY | Facility: CLINIC | Age: 66
End: 2021-02-23

## 2021-02-23 VITALS
TEMPERATURE: 98 F | HEART RATE: 74 BPM | DIASTOLIC BLOOD PRESSURE: 60 MMHG | WEIGHT: 244.6 LBS | BODY MASS INDEX: 31.39 KG/M2 | HEIGHT: 74 IN | SYSTOLIC BLOOD PRESSURE: 110 MMHG

## 2021-02-23 DIAGNOSIS — I25.10 ENDOTHELIAL DYSFUNCTION OF CORONARY ARTERY: Primary | ICD-10-CM

## 2021-02-23 DIAGNOSIS — E78.1 HYPERTRIGLYCERIDEMIA: ICD-10-CM

## 2021-02-23 DIAGNOSIS — E78.00 HYPERCHOLESTEREMIA: ICD-10-CM

## 2021-02-23 DIAGNOSIS — I10 ESSENTIAL HYPERTENSION: ICD-10-CM

## 2021-02-23 DIAGNOSIS — J30.9 ALLERGIC RHINITIS, UNSPECIFIED SEASONALITY, UNSPECIFIED TRIGGER: ICD-10-CM

## 2021-02-23 PROCEDURE — 99213 OFFICE O/P EST LOW 20 MIN: CPT | Performed by: NURSE PRACTITIONER

## 2021-02-23 RX ORDER — MONTELUKAST SODIUM 10 MG/1
TABLET ORAL
Qty: 90 TABLET | Refills: 3 | Status: SHIPPED | OUTPATIENT
Start: 2021-02-23 | End: 2022-01-26 | Stop reason: SDUPTHER

## 2021-02-23 NOTE — PROGRESS NOTES
Chief Complaint   Patient presents with   • Follow-up     Cardiac management . Had Covid December 2020, reports no side effects  .   • Labs     No current labs, Labs from  August 2020 on chart.   • Med Refill     Needs refills on Singulair  [facility] .       Subjective       Dell Fowler is a 65 y.o. male  with hypertension, hypercholesterolemia, and endothelial dysfunction.  Cardiac catheterizations in 2006 and 2016 have shown normal coronaries and EF 50%. He has been managed medically.  In July 2019 he was seen due to issues with dizziness, generalized weakness and chest pain. His blood pressure had been low and Isosorbide stopped. When he stopped the long acting Nitrate he developed chest pressure on two occassions for which he took 1/2 tablet of nitrate along with rest and symptoms resolved. No palpitations noted.  Ranexa was prescribed and cardiac work-up ordered.  On 8/6/2019 he underwent nuclear stress test which showed increased chronotropic response and PVCs.  Inferior septal wall infarct with joshua-infarct ischemia versus diaphragmatic attenuation was noted.  Low-dose beta-blocker was added with recommendation for elective cardiac catheterization should symptoms persist. Echo showed LVEF 41-45%, similar to 2016 echo report.  Carotid ultrasound showed mild plaque with no hemodynamically significant stenosis.  He was seen 8/13/2019 and voiced concern over persistent fatigue and episodes of dizziness.  It was decided he would proceed with elective cardiac catheterization. On 8/22/2019 cardiac catheterization showed normal coronary arteries with recommendation to continue medical management.  The dose of Ranexa was increased and his symptoms significantly improved.    Today he comes the office for a follow-up visit. He denies cardiac symptoms or concerns. No recent change in cardiac medications noted. In December he was diagnosed with Covid and has recovered well except for some mild  residual chest wall discomfort and occasional cough.    Cardiac History:    Past Surgical History:   Procedure Laterality Date   • CARDIAC CATHETERIZATION  11/22/2006    (Dr. Briseno) Normal Coronaries   • CARDIAC CATHETERIZATION  02/29/2016    normal coronaries EF 50%   • CARDIAC CATHETERIZATION  08/22/2019    Normal Coronaries   • CARDIOVASCULAR STRESS TEST  11/21/2006    ([facility] Dr. Briseno) 9 min, 93% THR. EF 48%. Anteroseptal and Inferior Ischemia   • CARDIOVASCULAR STRESS TEST  02/25/2016    6 min 30 sec, 91% THR. EF (s) 24%. (R) 43%. Inferoseptal Ischemia   • CARDIOVASCULAR STRESS TEST  08/06/2019    6 Min. 7.0 METS. 92% THR. BP- 148/81. EF 42%. PVC. Inferoseptal Infarct with periinfarct Ischemia.   • ECHO - CONVERTED  11/2006    ([facility]) Dr. Briseno Normal EF    • ECHO - CONVERTED  02/25/2016    EF 45%. Inferoseptal WMA   • ECHO - CONVERTED  08/06/2019    EF 45%. Inferoseptal WMA. AO- 4.0 Cm       Current Outpatient Medications   Medication Sig Dispense Refill   • aspirin 81 MG EC tablet Take 81 mg by mouth daily.     • atorvastatin (LIPITOR) 10 MG tablet TAKE 1 TABLET DAILY 90 tablet 3   • carvedilol (COREG) 3.125 MG tablet TAKE 1 TABLET TWICE A  tablet 2   • cetirizine (ZyrTEC) 10 MG tablet Take 10 mg by mouth daily.     • finasteride (PROSCAR) 5 MG tablet Take 5 mg by mouth Daily.     • L-ARGININE MAXIMUM STRENGTH 1000 MG tablet TAKE 1 TABLET BY MOUTH 2 (TWO) TIMES A DAY. 180 each 3   • lisinopril (PRINIVIL,ZESTRIL) 5 MG tablet TAKE 1/2 (ONE-HALF) TABLET DAILY (DISCONTINUE LISINOPRIL/HCTZ) 45 tablet 3   • montelukast (SINGULAIR) 10 MG tablet At night if needed for allergy type symptoms 90 tablet 3   • nitroglycerin (NITROSTAT) 0.4 MG SL tablet 1 under the tongue as needed for angina, may repeat q5mins for up three doses 25 tablet 1   • omeprazole (PriLOSEC) 20 MG capsule Take 1 capsule by mouth daily. 90 capsule 3   • ranolazine (Ranexa) 500 MG 12 hr tablet Take 1 tablet by mouth 2 (Two) Times a Day.  180 tablet 3   • tamsulosin (FLOMAX) 0.4 MG capsule 24 hr capsule Take 1 capsule by mouth every night.       No current facility-administered medications for this visit.        Patient has no known allergies.    Past Medical History:   Diagnosis Date   • BPH (benign prostatic hyperplasia)     followed by Dr. Llamas   • GERD (gastroesophageal reflux disease)    • Hyperlipidemia    • Hypertension    • S/P wrist surgery     bilateral surgical repair       Social History     Socioeconomic History   • Marital status:      Spouse name: Not on file   • Number of children: Not on file   • Years of education: Not on file   • Highest education level: Not on file   Tobacco Use   • Smoking status: Former Smoker     Quit date:      Years since quittin.1   • Smokeless tobacco: Never Used   Substance and Sexual Activity   • Alcohol use: Yes     Comment: occasional mixed drink   • Drug use: No       Family History   Problem Relation Age of Onset   • COPD Mother    • Heart failure Mother    • Heart attack Father         multiple- first in his 50's   • Heart disease Father         cabg in his 60's   • Heart disease Other         stenting at age 65       Review of Systems   Constitution: Negative for decreased appetite, diaphoresis and malaise/fatigue.   HENT: Negative for nosebleeds.    Eyes: Negative for blurred vision.   Cardiovascular: Negative for chest pain, claudication, cyanosis, dyspnea on exertion, irregular heartbeat, leg swelling, near-syncope, orthopnea, palpitations, paroxysmal nocturnal dyspnea and syncope.   Respiratory: Positive for cough. Negative for shortness of breath.    Endocrine: Negative for cold intolerance and heat intolerance.   Hematologic/Lymphatic: Does not bruise/bleed easily.   Skin: Negative for rash.   Musculoskeletal: Negative for myalgias.   Gastrointestinal: Negative for heartburn, melena and nausea.   Genitourinary: Negative for dysuria and hematuria.   Neurological: Negative for  "dizziness and light-headedness.   Psychiatric/Behavioral: The patient does not have insomnia and is not nervous/anxious.         BP Readings from Last 5 Encounters:   02/23/21 110/60   06/30/20 118/80   10/31/19 100/60   08/13/19 118/64   07/23/19 130/74       Wt Readings from Last 5 Encounters:   02/23/21 111 kg (244 lb 9.6 oz)   06/30/20 108 kg (238 lb 6.4 oz)   10/31/19 108 kg (237 lb)   08/13/19 108 kg (238 lb 9.6 oz)   07/23/19 107 kg (236 lb 3.2 oz)       Objective      Labs 8/13/2020 per PCP: CBC unremarkable, glucose 112, BUN 16, creatinine 1.1, sodium 138, potassium 4.6, AST 32, ALP 77, calcium 9.5, GFR 74, ALT 34, total cholesterol 165, triglycerides 273, HDL 47, LDL 63, TSH 1.92    /60 (BP Location: Left arm)   Pulse 74   Temp 98 °F (36.7 °C)   Ht 188 cm (74\")   Wt 111 kg (244 lb 9.6 oz)   BMI 31.40 kg/m²     Vitals signs and nursing note reviewed.   Eyes:      Pupils: Pupils are equal, round, and reactive to light.   HENT:      Head: Normocephalic.   Neck:      Musculoskeletal: Normal range of motion.      Vascular: No carotid bruit.   Pulmonary:      Breath sounds: Normal breath sounds.   Cardiovascular:      Normal rate. Regular rhythm.   Edema:     Peripheral edema absent.   Abdominal:      General: Bowel sounds are normal.      Palpations: Abdomen is soft.   Musculoskeletal: Normal range of motion.   Skin:     General: Skin is warm.   Neurological:      Mental Status: Alert and oriented to person, place, and time.          Procedures: none today          Assessment/Plan   Diagnoses and all orders for this visit:    1. Endothelial dysfunction of coronary artery (Primary)    2. Essential hypertension    3. Hypercholesteremia    4. Hypertriglyceridemia    5. Allergic rhinitis, unspecified seasonality, unspecified trigger  -     montelukast (SINGULAIR) 10 MG tablet; At night if needed for allergy type symptoms  Dispense: 90 tablet; Refill: 3      Endothelial dysfunction-patient presents today " without cardiac symptoms. He continues Ranexa with benefit. No refills needed at this time. Continue low-dose daily aspirin as he denies GI symptoms or increased bruising. He does have nitro that is up to date if needed for recurrence of chest pain.    Hypertension-blood pressure normal. Continue carvedilol 3.125 mg twice a day and lisinopril 5 mg daily.    Hypercholesterolemia-currently on Lipitor without issues. Thank you for sending copy of lab results. LDL is well controlled. His triglycerides were increased as well as glucose slightly increased. I encouraged him on low triglyceride diet. He will follow with you for lab orders/management. Please forward copy of next lab results.    Allergic rhinitis-refills given for Singulair which she has found beneficial in controlling symptoms.    Patient's Body mass index is 31.4 kg/m². BMI is above normal parameters. Recommendations include: nutrition counseling.     A annual follow-up visit will be scheduled. Please call sooner for cardiac concerns.             Electronically signed by OREN Kendrick,  February 23, 2021 14:00 EST

## 2021-02-24 RX ORDER — NITROGLYCERIN 0.4 MG/1
TABLET SUBLINGUAL
Qty: 25 TABLET | Refills: 1 | Status: SHIPPED | OUTPATIENT
Start: 2021-02-24

## 2021-05-19 DIAGNOSIS — I25.10 ENDOTHELIAL DYSFUNCTION OF CORONARY ARTERY: ICD-10-CM

## 2021-05-20 RX ORDER — RANOLAZINE 500 MG/1
TABLET, EXTENDED RELEASE ORAL
Qty: 180 TABLET | Refills: 3 | Status: SHIPPED | OUTPATIENT
Start: 2021-05-20 | End: 2022-02-25 | Stop reason: SDUPTHER

## 2021-07-06 DIAGNOSIS — I10 ESSENTIAL HYPERTENSION: ICD-10-CM

## 2021-07-06 RX ORDER — CARVEDILOL 3.12 MG/1
TABLET ORAL
Qty: 180 TABLET | Refills: 3 | Status: SHIPPED | OUTPATIENT
Start: 2021-07-06 | End: 2022-02-25 | Stop reason: SDUPTHER

## 2021-10-18 ENCOUNTER — TELEPHONE (OUTPATIENT)
Dept: CARDIOLOGY | Facility: CLINIC | Age: 66
End: 2021-10-18

## 2021-10-18 NOTE — TELEPHONE ENCOUNTER
Received fax from Dr. Faraz Reyes cardiac clearance for patient to have a left TKA. Patient is on aspirin, unclear if needing to hold. According to our records, I do not see where patient has had any stenting.         Fax [phone]  Attn: Alma Haider  Fax [phone]

## 2021-10-22 DIAGNOSIS — I10 ESSENTIAL HYPERTENSION: ICD-10-CM

## 2021-10-22 RX ORDER — LISINOPRIL 5 MG/1
TABLET ORAL
Qty: 45 TABLET | Refills: 3 | Status: SHIPPED | OUTPATIENT
Start: 2021-10-22 | End: 2022-02-25 | Stop reason: SDUPTHER

## 2021-10-28 DIAGNOSIS — E78.00 HYPERCHOLESTEREMIA: ICD-10-CM

## 2021-10-28 RX ORDER — ATORVASTATIN CALCIUM 10 MG/1
TABLET, FILM COATED ORAL
Qty: 90 TABLET | Refills: 3 | Status: SHIPPED | OUTPATIENT
Start: 2021-10-28 | End: 2022-02-25 | Stop reason: SDUPTHER

## 2022-01-26 DIAGNOSIS — J30.9 ALLERGIC RHINITIS, UNSPECIFIED SEASONALITY, UNSPECIFIED TRIGGER: ICD-10-CM

## 2022-01-26 RX ORDER — MONTELUKAST SODIUM 10 MG/1
TABLET ORAL
Qty: 90 TABLET | Refills: 3 | Status: SHIPPED | OUTPATIENT
Start: 2022-01-26 | End: 2022-02-25 | Stop reason: SDUPTHER

## 2022-02-25 DIAGNOSIS — I25.10 ENDOTHELIAL DYSFUNCTION OF CORONARY ARTERY: ICD-10-CM

## 2022-02-25 DIAGNOSIS — E78.00 HYPERCHOLESTEREMIA: ICD-10-CM

## 2022-02-25 DIAGNOSIS — I10 ESSENTIAL HYPERTENSION: ICD-10-CM

## 2022-02-25 DIAGNOSIS — J30.9 ALLERGIC RHINITIS, UNSPECIFIED SEASONALITY, UNSPECIFIED TRIGGER: ICD-10-CM

## 2022-02-28 RX ORDER — MONTELUKAST SODIUM 10 MG/1
TABLET ORAL
Qty: 90 TABLET | Refills: 3 | Status: SHIPPED | OUTPATIENT
Start: 2022-02-28

## 2022-02-28 RX ORDER — RANOLAZINE 500 MG/1
500 TABLET, EXTENDED RELEASE ORAL 2 TIMES DAILY
Qty: 180 TABLET | Refills: 3 | Status: SHIPPED | OUTPATIENT
Start: 2022-02-28 | End: 2023-03-08 | Stop reason: DRUGHIGH

## 2022-02-28 RX ORDER — CARVEDILOL 3.12 MG/1
3.12 TABLET ORAL 2 TIMES DAILY
Qty: 180 TABLET | Refills: 3 | Status: SHIPPED | OUTPATIENT
Start: 2022-02-28 | End: 2023-03-08 | Stop reason: SDUPTHER

## 2022-02-28 RX ORDER — LISINOPRIL 5 MG/1
TABLET ORAL
Qty: 45 TABLET | Refills: 3 | Status: SHIPPED | OUTPATIENT
Start: 2022-02-28 | End: 2022-10-13 | Stop reason: SDUPTHER

## 2022-02-28 RX ORDER — ATORVASTATIN CALCIUM 10 MG/1
10 TABLET, FILM COATED ORAL DAILY
Qty: 90 TABLET | Refills: 3 | Status: SHIPPED | OUTPATIENT
Start: 2022-02-28 | End: 2023-03-08 | Stop reason: SDUPTHER

## 2022-04-20 ENCOUNTER — OFFICE VISIT (OUTPATIENT)
Dept: CARDIOLOGY | Facility: CLINIC | Age: 67
End: 2022-04-20

## 2022-04-20 VITALS — BODY MASS INDEX: 31.6 KG/M2 | HEIGHT: 74 IN | WEIGHT: 246.2 LBS

## 2022-04-20 DIAGNOSIS — E78.00 HYPERCHOLESTEREMIA: ICD-10-CM

## 2022-04-20 DIAGNOSIS — I10 ESSENTIAL HYPERTENSION: ICD-10-CM

## 2022-04-20 DIAGNOSIS — R42 DIZZINESS: ICD-10-CM

## 2022-04-20 DIAGNOSIS — R07.89 CHEST PRESSURE: ICD-10-CM

## 2022-04-20 DIAGNOSIS — I25.10 ENDOTHELIAL DYSFUNCTION OF CORONARY ARTERY: Primary | ICD-10-CM

## 2022-04-20 PROCEDURE — 99214 OFFICE O/P EST MOD 30 MIN: CPT | Performed by: NURSE PRACTITIONER

## 2022-04-20 RX ORDER — MELOXICAM 15 MG/1
15 TABLET ORAL DAILY
COMMUNITY

## 2022-04-20 NOTE — PROGRESS NOTES
"Chief Complaint   Patient presents with   • Follow-up     Cardiac management   • LABS     Current labs , September 2021 on chart   • Chest Pain     Describes chest discomfort with palpitations , has noticed is more frequently the last couple of weeks   • Dizziness     Notices dizziness after taking morning meds   • Med Refill     No refills needed today.       Subjective       Dell Fowler is a 66 y.o. male  with hypertension, hypercholesterolemia, and endothelial dysfunction.  Cardiac catheterizations in 2006 and 2016 have shown normal coronaries and EF 50%. He has been managed medically.  In July 2019 he was seen due to issues with dizziness, generalized weakness and chest pain. His blood pressure had been low and Isosorbide stopped. When he stopped the long acting Nitrate he developed chest pressure on two occassions for which he took 1/2 tablet of nitrate along with rest and symptoms resolved. No palpitations noted.  Ranexa was prescribed and cardiac work-up ordered.  On 8/6/2019 he underwent nuclear stress test which showed increased chronotropic response and PVCs.  Inferior septal wall infarct with joshua-infarct ischemia versus diaphragmatic attenuation was noted.  Low-dose beta-blocker was added with recommendation for elective cardiac catheterization should symptoms persist. Echo showed LVEF 41-45%, similar to 2016 echo report.  Carotid ultrasound showed mild plaque with no hemodynamically significant stenosis.  He was seen 8/13/2019 and voiced concern over persistent fatigue and episodes of dizziness.  It was decided he would proceed with elective cardiac catheterization. On 8/22/2019 cardiac catheterization showed normal coronary arteries with recommendation to continue medical management.  The dose of Ranexa was increased and his symptoms significantly improved    The patient complains of discomfort in his chest and an elevated heart rate. He adds it \"it just fades away\". The patient reports being " prescribed nitroglycerin; however, he has not opened the bottle very minimal times. He endorses taking carvedilol. The patient complains of chest pain. He last had a cardiac catheterization in 2019 and and an ultrasound of the arteries in the neck as well. The patient complains of dizziness after taking his medications in the morning. He adds his blood pressure tends to be lower in the mornings when he wakes up. The patient reports taking ranolazine once daily in the mornings. He complains of his neck becoming more sore recently and he wonders if this is more musculoskeletal. The patient reports when he stands up at [employer] and closes his eyes he feels as dizzy when he does this.     The patient normally follows up with Dr. Valente for his laboratory studies.     Past Surgical History:   Procedure Laterality Date   • CARDIAC CATHETERIZATION  11/22/2006    (Dr. Briseno) Normal Coronaries   • CARDIAC CATHETERIZATION  02/29/2016    normal coronaries EF 50%   • CARDIAC CATHETERIZATION  08/22/2019    Normal Coronaries   • CARDIOVASCULAR STRESS TEST  11/21/2006    ([facility] Dr. Briseno) 9 min, 93% THR. EF 48%. Anteroseptal and Inferior Ischemia   • CARDIOVASCULAR STRESS TEST  02/25/2016    6 min 30 sec, 91% THR. EF (s) 24%. (R) 43%. Inferoseptal Ischemia   • CARDIOVASCULAR STRESS TEST  08/06/2019    6 Min. 7.0 METS. 92% THR. BP- 148/81. EF 42%. PVC. Inferoseptal Infarct with periinfarct Ischemia.   • ECHO - CONVERTED  11/2006    ([facility]) Dr. Briseno Normal EF    • ECHO - CONVERTED  02/25/2016    EF 45%. Inferoseptal WMA   • ECHO - CONVERTED  08/06/2019    EF 45%. Inferoseptal WMA. AO- 4.0 Cm       Current Outpatient Medications   Medication Sig Dispense Refill   • aspirin 81 MG EC tablet Take 81 mg by mouth daily.     • atorvastatin (LIPITOR) 10 MG tablet Take 1 tablet by mouth Daily. 90 tablet 3   • carvedilol (COREG) 3.125 MG tablet Take 1 tablet by mouth 2 (Two) Times a Day. 180 tablet 3   • cetirizine (ZyrTEC) 10 MG tablet  Take 10 mg by mouth daily.     • finasteride (PROSCAR) 5 MG tablet Take 5 mg by mouth Daily.     • L-ARGININE MAXIMUM STRENGTH 1000 MG tablet TAKE 1 TABLET BY MOUTH 2 (TWO) TIMES A DAY. 180 each 3   • lisinopril (PRINIVIL,ZESTRIL) 5 MG tablet TAKE ONE-HALF (1/2) TABLET DAILY 45 tablet 3   • meloxicam (MOBIC) 15 MG tablet Take 15 mg by mouth Daily.     • montelukast (SINGULAIR) 10 MG tablet At night if needed for allergy type symptoms 90 tablet 3   • nitroglycerin (NITROSTAT) 0.4 MG SL tablet 1 under the tongue as needed for angina, may repeat q5mins for up three doses 25 tablet 1   • omeprazole (PriLOSEC) 20 MG capsule Take 1 capsule by mouth daily. 90 capsule 3   • ranolazine (RANEXA) 500 MG 12 hr tablet Take 1 tablet by mouth 2 (Two) Times a Day. 180 tablet 3   • tamsulosin (FLOMAX) 0.4 MG capsule 24 hr capsule Take 1 capsule by mouth every night.       No current facility-administered medications for this visit.       Patient has no known allergies.    Past Medical History:   Diagnosis Date   • BPH (benign prostatic hyperplasia)     followed by Dr. Llamas   • Coronary artery disease    • GERD (gastroesophageal reflux disease)    • Hyperlipidemia    • Hypertension    • S/P wrist surgery     bilateral surgical repair       Social History     Socioeconomic History   • Marital status:    Tobacco Use   • Smoking status: Former Smoker     Packs/day: 0.50     Years: 4.00     Pack years: 2.00     Types: Cigarettes     Start date: 1972     Quit date: 1976     Years since quittin.3   • Smokeless tobacco: Never Used   Vaping Use   • Vaping Use: Never used   Substance and Sexual Activity   • Alcohol use: Yes     Alcohol/week: 3.0 standard drinks     Types: 3 Drinks containing 0.5 oz of alcohol per week     Comment: occasional mixed drink   • Drug use: No   • Sexual activity: Not Currently     Partners: Female     Birth control/protection: Post-menopausal       Family History   Problem Relation Age of Onset    • COPD Mother    • Heart failure Mother    • Heart attack Father    • Heart disease Father    • Heart disease Other         stenting at age 65       Review of Systems   Constitutional: Negative for decreased appetite, diaphoresis and malaise/fatigue.   HENT: Negative for nosebleeds.    Eyes: Negative for blurred vision.   Cardiovascular: Positive for chest pain. Negative for claudication, cyanosis, dyspnea on exertion, irregular heartbeat, leg swelling, near-syncope, orthopnea, palpitations, paroxysmal nocturnal dyspnea and syncope.   Respiratory: Negative for shortness of breath and snoring.    Endocrine: Negative for cold intolerance and heat intolerance.   Hematologic/Lymphatic: Does not bruise/bleed easily.   Skin: Negative for rash.   Musculoskeletal: Positive for arthritis, neck pain and stiffness. Negative for myalgias.   Gastrointestinal: Negative for heartburn, melena and nausea.   Genitourinary: Negative for dysuria and hematuria.   Neurological: Positive for dizziness. Negative for light-headedness.   Psychiatric/Behavioral: The patient is nervous/anxious. The patient does not have insomnia.         BP Readings from Last 5 Encounters:   02/23/21 110/60   06/30/20 118/80   10/31/19 100/60   08/13/19 118/64   07/23/19 130/74       Wt Readings from Last 5 Encounters:   04/20/22 112 kg (246 lb 3.2 oz)   02/23/21 111 kg (244 lb 9.6 oz)   06/30/20 108 kg (238 lb 6.4 oz)   10/31/19 108 kg (237 lb)   08/13/19 108 kg (238 lb 9.6 oz)       Objective      Labs 9/27/2021 per PCP: WBC 7.5, RBC 5.02, hemoglobin 15.6, hematocrit 46.1, platelets 233, glucose 104, BUN 18, creatinine 1.1, sodium 139, testing 4.8, [place] 5, CO2 26, calcium 9.4, total protein 7.2, albumin 4.2, AST 36, ALP 84, total bili 0.5, GFR 71, A1c 5.8    Labs 8/13/2020 per PCP: CBC unremarkable, glucose 112, BUN 16, creatinine 1.1, sodium 138, potassium 4.6, AST 32, ALP 77, calcium 9.5, GFR 74, ALT 34, total cholesterol 165, triglycerides 273, HDL  "47, LDL 63, TSH 1.92    Ht 188 cm (74\")   Wt 112 kg (246 lb 3.2 oz)   BMI 31.61 kg/m²     Vitals and nursing note reviewed.   Eyes:      Pupils: Pupils are equal, round, and reactive to light.   HENT:      Head: Normocephalic.   Neck:      Vascular: No carotid bruit or JVD.   Pulmonary:      Breath sounds: Normal breath sounds.   Cardiovascular:      Normal rate. Regular rhythm.   Pulses:     Intact distal pulses.   Edema:     Peripheral edema absent.   Abdominal:      General: Bowel sounds are normal.      Palpations: Abdomen is soft.   Musculoskeletal: Normal range of motion.      Cervical back: Normal range of motion. Skin:     General: Skin is warm.   Neurological:      Mental Status: Alert and oriented to person, place, and time.          Procedures: none today          Assessment/Plan   Diagnoses and all orders for this visit:    1. Endothelial dysfunction of coronary artery (Primary)    2. Essential hypertension    3. Hypercholesteremia    4. Chest pressure    5. Dizziness      Chest discomfort-he agrees to try increase dose of Ranexa being 500 mg twice a day.  He will monitor for any worsening dizziness.  As well he will monitor for change in chest discomfort.  He was also instructed to take nitroglycerin if develops significant pain.  Informational handout on how to use as needed was provided.  Also continue L-arginine.    Hypertension-blood pressure normal.  Continue carvedilol and lisinopril.     He admits to some palpitations.  Continue beta-blocker.  We discussed triggers to avoid.  Informational handout on heart palpitations provided.    If symptoms persist he understands to call and cardiac work-up will be ordered.    Please forward copy of next lab results.           Transcribed from ambient dictation for OREN Kendrick by Trudy Del Real.  04/20/22   08:21 EDT    Patient verbalized consent to the visit recording.       "

## 2022-04-22 DIAGNOSIS — I25.10 ENDOTHELIAL DYSFUNCTION OF CORONARY ARTERY: ICD-10-CM

## 2022-04-22 RX ORDER — RANOLAZINE 500 MG/1
TABLET, EXTENDED RELEASE ORAL
Qty: 180 TABLET | Refills: 3 | OUTPATIENT
Start: 2022-04-22

## 2022-10-13 DIAGNOSIS — I10 ESSENTIAL HYPERTENSION: ICD-10-CM

## 2022-10-13 NOTE — TELEPHONE ENCOUNTER
Patient called said he has been noticing elevated BP, 140-142 systolic and diastolic  90's  HR 75-90 he is currently taking Lisinopril 5 mg 1/2 tablet daily . He also adds that he has been having chest discomfort about 2-3 times weekly , he has not taken Nitroglycerin .  Last cardiac testing was in 2019, last office visit was April 2022

## 2022-10-13 NOTE — TELEPHONE ENCOUNTER
Patient aware to take Lisinopril 5 mg daily  (he had currently been taking 1/2 tablet daily ).  He is aware he can increase Ranexa to 1000 mg  BID.

## 2022-10-14 RX ORDER — LISINOPRIL 5 MG/1
5 TABLET ORAL DAILY
Qty: 90 TABLET | Refills: 3 | Status: SHIPPED | OUTPATIENT
Start: 2022-10-14

## 2022-11-14 ENCOUNTER — TELEPHONE (OUTPATIENT)
Dept: CARDIOLOGY | Facility: CLINIC | Age: 67
End: 2022-11-14

## 2022-11-14 NOTE — TELEPHONE ENCOUNTER
I spoke with patient and he has requested to have Stress test and Echo done due to chest discomfort and  SOA and fatigue with exertion. Increased Ranolazine has not helped with his symptoms .

## 2022-11-14 NOTE — TELEPHONE ENCOUNTER
Patient advised if Chest discomfort continue to worsen  to go to ER for evaluation, he also has Nitroglycerin and is aware to take if needed.  He verbalizes understanding .

## 2022-11-14 NOTE — TELEPHONE ENCOUNTER
Caller: ANISA    Relationship to patient: SELF    Best call back number: [phone]    Patient is needing: PATIENT HAS BEEN HAVING CHEST DISCOMFORT FOR ABOUT 3 WEEKS. TALKED TO MATTHEW AND WAS TOLD TO DOUBLE UP ON RANEXA (1000 MG 2X DAILY) AND THAT HASN'T HELPED GETTING WORSE. HAPPENS DAILY. PLEASE ADVISE.

## 2022-11-15 DIAGNOSIS — I25.10 ENDOTHELIAL DYSFUNCTION OF CORONARY ARTERY: Primary | ICD-10-CM

## 2022-11-15 DIAGNOSIS — R53.83 OTHER FATIGUE: ICD-10-CM

## 2022-11-15 DIAGNOSIS — R07.89 OTHER CHEST PAIN: ICD-10-CM

## 2022-11-15 DIAGNOSIS — R06.02 SHORTNESS OF BREATH: ICD-10-CM

## 2022-11-15 NOTE — TELEPHONE ENCOUNTER
Pt made aware someone will be contacting him to set up appointment for stress test and echo.  Verbalizes ok.

## 2022-12-15 ENCOUNTER — APPOINTMENT (OUTPATIENT)
Dept: CARDIOLOGY | Facility: HOSPITAL | Age: 67
End: 2022-12-15
Payer: MEDICARE

## 2023-01-03 ENCOUNTER — TELEPHONE (OUTPATIENT)
Dept: CARDIOLOGY | Facility: CLINIC | Age: 68
End: 2023-01-03
Payer: MEDICARE

## 2023-01-03 NOTE — TELEPHONE ENCOUNTER
Received fax from Dr. Coreen Hardy for cardiac clearance for patient to have an L2-5 decompression with possibly including L1. Patient is on aspirin, unclear if needing to hold. According to our records, I do not see where patient has had any stenting.        Fax [phone]  Attn: Nikki Escobar

## 2023-03-08 DIAGNOSIS — E78.00 HYPERCHOLESTEREMIA: ICD-10-CM

## 2023-03-08 DIAGNOSIS — I10 ESSENTIAL HYPERTENSION: ICD-10-CM

## 2023-03-08 RX ORDER — RANOLAZINE 1000 MG/1
1000 TABLET, EXTENDED RELEASE ORAL 2 TIMES DAILY
Qty: 180 TABLET | Refills: 3 | Status: SHIPPED | OUTPATIENT
Start: 2023-03-08

## 2023-03-08 RX ORDER — ATORVASTATIN CALCIUM 10 MG/1
10 TABLET, FILM COATED ORAL DAILY
Qty: 90 TABLET | Refills: 3 | Status: SHIPPED | OUTPATIENT
Start: 2023-03-08

## 2023-03-08 RX ORDER — CARVEDILOL 3.12 MG/1
3.12 TABLET ORAL 2 TIMES DAILY
Qty: 180 TABLET | Refills: 3 | Status: SHIPPED | OUTPATIENT
Start: 2023-03-08

## 2023-03-08 NOTE — TELEPHONE ENCOUNTER
Patient called needing refills on ranexa 1000 mg BID, it was increased on 10/13/22 and he states that it has been tolerated well. He is also needing refills on coreg 3.125 mg BID and atorvastatin 10 mg daily. 90 day supplies with 3 refills are pended to be sent to [facility].

## 2023-03-28 ENCOUNTER — TELEPHONE (OUTPATIENT)
Dept: CARDIOLOGY | Facility: CLINIC | Age: 68
End: 2023-03-28
Payer: MEDICARE

## 2023-03-28 NOTE — TELEPHONE ENCOUNTER
Caller: Dell Fowler    Relationship: Self    Best call back number: [phone]    What was the call regarding: PT WAS PUT ON TERBINAFINE FOR A TOE FUNGUS, PT WANTED TO KNOW IF THIS MEDICATION WILL INTERACT WITH ANY OF HIS OTHER MEDICATIONS - WOULD LIKE A CALL BACK TO CONFIRM    Do you require a callback: YES

## 2023-04-20 DIAGNOSIS — J30.9 ALLERGIC RHINITIS, UNSPECIFIED SEASONALITY, UNSPECIFIED TRIGGER: ICD-10-CM

## 2023-04-20 NOTE — TELEPHONE ENCOUNTER
Caller: Dell Fowler    Relationship: Self    Best call back number: [phone]    Requested Prescriptions:   Requested Prescriptions     Pending Prescriptions Disp Refills   • montelukast (SINGULAIR) 10 MG tablet 90 tablet 3     Sig: At night if needed for allergy type symptoms        Pharmacy where request should be sent: [facility] - [facility] [address] - [phone]  - [phone] FX     Last office visit with prescribing clinician: 4/20/2022   Last telemedicine visit with prescribing clinician: 7/6/2023   Next office visit with prescribing clinician: 7/6/2023     Additional details provided by patient: PATIENT NEEDS REFILLS    Does the patient have less than a 3 day supply:  [] Yes  [x] No    Would you like a call back once the refill request has been completed: [] Yes [] No    If the office needs to give you a call back, can they leave a voicemail: [] Yes [] No    Betty Thopre   04/20/23 10:00 EDT

## 2023-04-21 RX ORDER — MONTELUKAST SODIUM 10 MG/1
TABLET ORAL
Qty: 90 TABLET | Refills: 3 | Status: SHIPPED | OUTPATIENT
Start: 2023-04-21

## 2023-06-05 ENCOUNTER — TELEPHONE (OUTPATIENT)
Dept: CARDIOLOGY | Facility: CLINIC | Age: 68
End: 2023-06-05
Payer: MEDICARE

## 2023-06-05 DIAGNOSIS — R06.02 SHORTNESS OF BREATH: ICD-10-CM

## 2023-06-05 DIAGNOSIS — R53.83 OTHER FATIGUE: ICD-10-CM

## 2023-06-05 DIAGNOSIS — I25.10 ENDOTHELIAL DYSFUNCTION OF CORONARY ARTERY: Primary | ICD-10-CM

## 2023-06-05 DIAGNOSIS — R42 DIZZINESS: ICD-10-CM

## 2023-06-05 DIAGNOSIS — I10 ESSENTIAL HYPERTENSION: ICD-10-CM

## 2023-06-05 DIAGNOSIS — R07.89 CHEST PRESSURE: ICD-10-CM

## 2023-06-05 NOTE — TELEPHONE ENCOUNTER
Patient has called in ,having issues with fatigue  , BP has been running low so he stopped Lisinopril with BP returning to normal. He has recently started taking medications that have helped his pain/discomfort in back. He had previously  been scheduled for Stress test and Echo in December 2022 but was unable to have done due to having Surgery on his back, he would like to proceed with having testing done .

## 2024-01-18 RX ORDER — RANOLAZINE 1000 MG/1
1 TABLET, EXTENDED RELEASE ORAL 2 TIMES DAILY
Qty: 180 TABLET | Refills: 3 | Status: SHIPPED | OUTPATIENT
Start: 2024-01-18

## 2024-02-21 DIAGNOSIS — I10 ESSENTIAL HYPERTENSION: ICD-10-CM

## 2024-02-21 DIAGNOSIS — E78.00 HYPERCHOLESTEREMIA: ICD-10-CM

## 2024-02-22 ENCOUNTER — OFFICE VISIT (OUTPATIENT)
Dept: CARDIOLOGY | Facility: CLINIC | Age: 69
End: 2024-02-22
Payer: MEDICARE

## 2024-02-22 VITALS
HEART RATE: 72 BPM | WEIGHT: 261.8 LBS | HEIGHT: 74 IN | DIASTOLIC BLOOD PRESSURE: 62 MMHG | BODY MASS INDEX: 33.6 KG/M2 | SYSTOLIC BLOOD PRESSURE: 124 MMHG

## 2024-02-22 DIAGNOSIS — Z79.899 MEDICATION MANAGEMENT: ICD-10-CM

## 2024-02-22 DIAGNOSIS — Z01.818 PREOPERATIVE CLEARANCE: Primary | ICD-10-CM

## 2024-02-22 DIAGNOSIS — I25.10 ENDOTHELIAL DYSFUNCTION OF CORONARY ARTERY: ICD-10-CM

## 2024-02-22 DIAGNOSIS — R07.89 CHEST PRESSURE: ICD-10-CM

## 2024-02-22 DIAGNOSIS — J30.9 ALLERGIC RHINITIS, UNSPECIFIED SEASONALITY, UNSPECIFIED TRIGGER: ICD-10-CM

## 2024-02-22 DIAGNOSIS — E78.00 HYPERCHOLESTEREMIA: ICD-10-CM

## 2024-02-22 DIAGNOSIS — I10 ESSENTIAL HYPERTENSION: ICD-10-CM

## 2024-02-22 PROCEDURE — 99214 OFFICE O/P EST MOD 30 MIN: CPT | Performed by: NURSE PRACTITIONER

## 2024-02-22 PROCEDURE — 1159F MED LIST DOCD IN RCRD: CPT | Performed by: NURSE PRACTITIONER

## 2024-02-22 PROCEDURE — 3074F SYST BP LT 130 MM HG: CPT | Performed by: NURSE PRACTITIONER

## 2024-02-22 PROCEDURE — 3078F DIAST BP <80 MM HG: CPT | Performed by: NURSE PRACTITIONER

## 2024-02-22 PROCEDURE — 1160F RVW MEDS BY RX/DR IN RCRD: CPT | Performed by: NURSE PRACTITIONER

## 2024-02-22 RX ORDER — CARVEDILOL 3.12 MG/1
3.12 TABLET ORAL 2 TIMES DAILY
Qty: 180 TABLET | Refills: 3 | OUTPATIENT
Start: 2024-02-22

## 2024-02-22 RX ORDER — CARVEDILOL 3.12 MG/1
3.12 TABLET ORAL 2 TIMES DAILY
Qty: 180 TABLET | Refills: 3 | Status: SHIPPED | OUTPATIENT
Start: 2024-02-22

## 2024-02-22 RX ORDER — ATORVASTATIN CALCIUM 10 MG/1
10 TABLET, FILM COATED ORAL DAILY
Qty: 90 TABLET | Refills: 3 | Status: SHIPPED | OUTPATIENT
Start: 2024-02-22

## 2024-02-22 RX ORDER — ATORVASTATIN CALCIUM 10 MG/1
10 TABLET, FILM COATED ORAL DAILY
Qty: 90 TABLET | Refills: 3 | OUTPATIENT
Start: 2024-02-22

## 2024-02-22 RX ORDER — MONTELUKAST SODIUM 10 MG/1
TABLET ORAL
Qty: 90 TABLET | Refills: 3 | Status: SHIPPED | OUTPATIENT
Start: 2024-02-22

## 2024-02-22 RX ORDER — RANOLAZINE 1000 MG/1
1 TABLET, EXTENDED RELEASE ORAL 2 TIMES DAILY
Qty: 180 TABLET | Refills: 3 | Status: SHIPPED | OUTPATIENT
Start: 2024-02-22

## 2024-02-22 NOTE — PROGRESS NOTES
Chief Complaint   Patient presents with    Follow-up     Cardiac management. Patient has called in office with c/o episode of chest pressure which Nitro resolved   He is currently scheduled on 2- to have L2-5 decompression , he has already gotten cardiac clearance    LABS     Lab results 2- on chart    Chest Pain     Had recent episode of chest pressure , he has not had any chest pressure since.   He does feel it could have been GI related.    Med Refill     Request refills on Ranexa, Singulair, Coreg, Lipitor 90 day supply  to [facility]       Subjective       Dell Fowler is a 68 y.o. male with HTN, hypercholesterolemia, endothelial dysfunction.  Cardiac catheterization in 2006, 2016 and 2019 showed normal coronaries.  Managed with Ranexa.  Carotid ultrasound showed mild plaque without significant stenosis.    On 6/23/2023 Lexiscan stress test appeared to show GI artifact. Echocardiogram showed mild LVH with normal EF at 51-55%. No significant valvular issues were noted.     Today he returns to the office for follow-up visit.  Recently had episode of chest pressure that resolved with nitro sublingual.  He denies recurrence and feels the symptom was related to GI.  Palpitations, dizziness, edema, or inappropriate shortness of breath denied.  He is no longer on lisbalance due to back problems and plans to have surgery at  [facility] next week.    Cardiac History:    Past Surgical History:   Procedure Laterality Date    CARDIAC CATHETERIZATION  11/22/2006    (Dr. Briseno) Normal Coronaries    CARDIAC CATHETERIZATION  02/29/2016    normal coronaries EF 50%    CARDIAC CATHETERIZATION  08/22/2019    Normal Coronaries    CARDIOVASCULAR STRESS TEST  11/21/2006    ([facility] Dr. Briseno) 9 min, 93% THR. EF 48%. Anteroseptal and Inferior Ischemia    CARDIOVASCULAR STRESS TEST  02/25/2016    6 min 30 sec, 91% THR. EF (s) 24%. (R) 43%. Inferoseptal Ischemia    CARDIOVASCULAR STRESS TEST   08/06/2019    6 Min. 7.0 METS. 92% THR. BP- 148/81. EF 42%. PVC. Inferoseptal Infarct with periinfarct Ischemia.    CARDIOVASCULAR STRESS TEST  06/22/2023    Lexiscan- EF 51%. GI Artifacts    ECHO - CONVERTED  11/2006    ([facility]) Dr. Briseno Normal EF     ECHO - CONVERTED  02/25/2016    EF 45%. Inferoseptal WMA    ECHO - CONVERTED  08/06/2019    EF 45%. Inferoseptal WMA. AO- 4.0 Cm    ECHO - CONVERTED  06/22/2023    TLS. EF 55%. Trace-Mild MR. AO- 3.7. RVSP- 20 mmHg       Current Outpatient Medications   Medication Sig Dispense Refill    aspirin 81 MG EC tablet Take 1 tablet by mouth Daily.      atorvastatin (LIPITOR) 10 MG tablet Take 1 tablet by mouth Daily. 90 tablet 3    carvedilol (COREG) 3.125 MG tablet Take 1 tablet by mouth 2 (Two) Times a Day. 180 tablet 3    cetirizine (ZyrTEC) 10 MG tablet Take 1 tablet by mouth Daily.      finasteride (PROSCAR) 5 MG tablet Take 1 tablet by mouth Daily.      glucosamine-chondroitin 500-400 MG capsule capsule Take 1 capsule by mouth Daily.      L-ARGININE MAXIMUM STRENGTH 1000 MG tablet TAKE 1 TABLET BY MOUTH 2 (TWO) TIMES A DAY. 180 each 3    meloxicam (MOBIC) 15 MG tablet Take 1 tablet by mouth Daily.      montelukast (SINGULAIR) 10 MG tablet At night if needed for allergy type symptoms 90 tablet 3    nitroglycerin (NITROSTAT) 0.4 MG SL tablet 1 under the tongue as needed for angina, may repeat q5mins for up three doses 25 tablet 1    omeprazole (PriLOSEC) 20 MG capsule Take 1 capsule by mouth daily. 90 capsule 3    potassium citrate (UROCIT-K) 10 MEQ (1080 MG) CR tablet Take 1 tablet by mouth 2 (Two) Times a Day. 2 tabs twice daily      ranolazine (RANEXA) 1000 MG 12 hr tablet Take 1 tablet by mouth 2 (Two) Times a Day. 180 tablet 3    tamsulosin (FLOMAX) 0.4 MG capsule 24 hr capsule Take 1 capsule by mouth Every Night.       No current facility-administered medications for this visit.       Patient has no known allergies.    Past Medical History:   Diagnosis Date    BPH  (benign prostatic hyperplasia)     followed by Dr. Llamas    Coronary artery disease     GERD (gastroesophageal reflux disease)     Hyperlipidemia     Hypertension     S/P wrist surgery     bilateral surgical repair       Social History     Socioeconomic History    Marital status:    Tobacco Use    Smoking status: Former     Packs/day: 0.50     Years: 4.00     Additional pack years: 0.00     Total pack years: 2.00     Types: Cigarettes     Start date: 1972     Quit date: 1976     Years since quittin.1    Smokeless tobacco: Never   Vaping Use    Vaping Use: Never used   Substance and Sexual Activity    Alcohol use: Yes     Alcohol/week: 3.0 standard drinks of alcohol     Types: 3 Drinks containing 0.5 oz of alcohol per week     Comment: occasional mixed drink    Drug use: No    Sexual activity: Not Currently     Partners: Female     Birth control/protection: Post-menopausal       Family History   Problem Relation Age of Onset    COPD Mother     Heart failure Mother     Heart attack Father     Heart disease Father     Heart disease Other         stenting at age 65       Review of Systems   Constitutional: Negative for diaphoresis and malaise/fatigue.   Cardiovascular:  Negative for chest pain, dyspnea on exertion, irregular heartbeat, leg swelling, near-syncope and palpitations.   Respiratory:  Negative for shortness of breath and sleep disturbances due to breathing.    Hematologic/Lymphatic: Negative for bleeding problem.   Skin:  Negative for color change.   Musculoskeletal:  Positive for muscle weakness (Legs relates to back problems). Negative for falls (in the past year, not recent) and myalgias.   Neurological:  Positive for loss of balance (Relates to back problems). Negative for dizziness and headaches.   Psychiatric/Behavioral:  The patient is not nervous/anxious.         BP Readings from Last 5 Encounters:   24 124/62   23 126/60   21 110/60   20 118/80   10/31/19  "100/60       Wt Readings from Last 5 Encounters:   02/22/24 119 kg (261 lb 12.8 oz)   07/06/23 113 kg (249 lb 12.8 oz)   06/22/23 112 kg (246 lb 14.6 oz)   04/20/22 112 kg (246 lb 3.2 oz)   02/23/21 111 kg (244 lb 9.6 oz)       Objective     Labs 2/21/2024: WBC 7, RBC 4.65, H&H 14.7 and 45.6, platelets 250, glucose 93, BUN 24, creatinine 1.23, sodium 140, potassium 4.8, chloride 104, CO2 24, calcium 9.8, GFR 63.6    /62 (BP Location: Left arm, Patient Position: Sitting)   Pulse 72   Ht 188 cm (74.02\")   Wt 119 kg (261 lb 12.8 oz)   BMI 33.59 kg/m²     Vitals and nursing note reviewed.   Constitutional:       Appearance: Healthy appearance. Not in distress.   Eyes:      Conjunctiva/sclera: Conjunctivae normal.      Pupils: Pupils are equal, round, and reactive to light.   HENT:      Head: Normocephalic.   Pulmonary:      Effort: Pulmonary effort is normal.      Breath sounds: Normal breath sounds.   Cardiovascular:      PMI at left midclavicular line. Normal rate. Regular rhythm.   Abdominal:      General: Bowel sounds are normal.      Palpations: Abdomen is soft.   Musculoskeletal: Normal range of motion.      Cervical back: Normal range of motion and neck supple. Skin:     General: Skin is warm and dry.   Neurological:      Mental Status: Alert, oriented to person, place, and time and oriented to person, place and time.          Procedures: none today          Assessment & Plan   Diagnoses and all orders for this visit:    1. Preoperative clearance (Primary)    2. Endothelial dysfunction of coronary artery  -     ranolazine (RANEXA) 1000 MG 12 hr tablet; Take 1 tablet by mouth 2 (Two) Times a Day.  Dispense: 180 tablet; Refill: 3    3. Essential hypertension  -     carvedilol (COREG) 3.125 MG tablet; Take 1 tablet by mouth 2 (Two) Times a Day.  Dispense: 180 tablet; Refill: 3    4. Chest pressure    5. Medication management    6. Hypercholesteremia  -     atorvastatin (LIPITOR) 10 MG tablet; Take 1 tablet " by mouth Daily.  Dispense: 90 tablet; Refill: 3    7. Allergic rhinitis, unspecified seasonality, unspecified trigger  -     montelukast (SINGULAIR) 10 MG tablet; At night if needed for allergy type symptoms  Dispense: 90 tablet; Refill: 3      Cardiac clearance  -neurosurgeon to fax request  -at this time no cardiac contraindication noted    Endothelial dysfx  -previous heart caths: normal coronaries  -stress test 2023: negative for ischemia  -continue ranexa, L-arginine  -continue statin, aspirin    HTN  -BP controlled, heart rate and rhythm normal  -agree with d/c of Lisinopril  -continue Coreg  -DASH diet encouraged    BMI 33  -weight up 12 pounds.  -decrease caloric intake encouraged     Hypercholesterolemia  -continue Lipitor  -labs followed by PCP    6 month follow up visit scheduled.                Electronically signed by OREN Kendrick,  February 22, 2024 15:51 EST    Dictated Utilizing Dragon Dictation: Part of this note may be an electronic transcription/translation of spoken language to printed text using the Dragon Dictation System.

## 2024-02-23 ENCOUNTER — TELEPHONE (OUTPATIENT)
Dept: CARDIOLOGY | Facility: CLINIC | Age: 69
End: 2024-02-23
Payer: MEDICARE

## 2024-02-26 ENCOUNTER — TELEPHONE (OUTPATIENT)
Dept: CARDIOLOGY | Facility: CLINIC | Age: 69
End: 2024-02-26
Payer: MEDICARE

## 2024-02-26 NOTE — TELEPHONE ENCOUNTER
Fax -  [phone]    Dr Requesting-  Dr Newman    Procedure- L2- T5 TLIF    Date of Procedure- 2/29/24    Testing-  Echo- 6/22/23  TLS. EF 55%. Trace-Mild MR. AO- 3.7. RVSP- 20 mmHg   Stress- 6/22/23  Lexiscan- EF 51%. GI Artifacts   Cath- 8/22/19    Normal Coronaries     Meds-     ASA 81 qd

## 2024-02-26 NOTE — TELEPHONE ENCOUNTER
Cardiac clearance letter sent with recommendations  
Okay.  5 days if they want.  
Received fax from Dr. Newman   at [place]  [facility].  Patient is scheduled  2- to have  L2-T5  TLIF .   He has no stenting and is on Aspirin 81 mg .   
no back pain/no joint pain

## 2024-09-03 ENCOUNTER — OFFICE VISIT (OUTPATIENT)
Dept: CARDIOLOGY | Facility: CLINIC | Age: 69
End: 2024-09-03
Payer: MEDICARE

## 2024-09-03 VITALS
HEART RATE: 72 BPM | BODY MASS INDEX: 32.75 KG/M2 | SYSTOLIC BLOOD PRESSURE: 120 MMHG | HEIGHT: 74 IN | DIASTOLIC BLOOD PRESSURE: 60 MMHG | WEIGHT: 255.2 LBS

## 2024-09-03 DIAGNOSIS — E78.00 HYPERCHOLESTEREMIA: ICD-10-CM

## 2024-09-03 DIAGNOSIS — I25.10 ENDOTHELIAL DYSFUNCTION OF CORONARY ARTERY: Primary | ICD-10-CM

## 2024-09-03 DIAGNOSIS — I10 ESSENTIAL HYPERTENSION: ICD-10-CM

## 2024-09-03 PROCEDURE — 3074F SYST BP LT 130 MM HG: CPT | Performed by: NURSE PRACTITIONER

## 2024-09-03 PROCEDURE — 1159F MED LIST DOCD IN RCRD: CPT | Performed by: NURSE PRACTITIONER

## 2024-09-03 PROCEDURE — 1160F RVW MEDS BY RX/DR IN RCRD: CPT | Performed by: NURSE PRACTITIONER

## 2024-09-03 PROCEDURE — 3078F DIAST BP <80 MM HG: CPT | Performed by: NURSE PRACTITIONER

## 2024-09-03 PROCEDURE — 99213 OFFICE O/P EST LOW 20 MIN: CPT | Performed by: NURSE PRACTITIONER

## 2024-09-03 RX ORDER — RANOLAZINE 1000 MG/1
TABLET, EXTENDED RELEASE ORAL
Start: 2024-09-03

## 2024-09-03 NOTE — PROGRESS NOTES
Chief Complaint   Patient presents with    Follow-up     Cardiac management.  Patient feels fatigued  but he feels is related to his back surgery, he feels he is getting stronger each day     LABS     March 2024 results in chart    Med Refill     No refills needed today.  Patient will call when refills needed        Subjective       Dell Fowler is a 69 y.o. male with HTN, hypercholesterolemia, endothelial dysfunction.  Cardiac catheterization in 2006, 2016 and 2019 showed normal coronaries.  Managed with Ranexa.  Carotid ultrasound showed mild plaque without significant stenosis.     On 6/23/2023 Lexiscan stress test appeared to show GI artifact. Echocardiogram showed mild LVH with normal EF at 51-55%. No significant valvular issues were noted.     Today he returns to the office for a follow-up visit.  He denies chest pain, palpitations, increase shortness of breath or edema.  TIA symptoms denied.  He admits to fatigue.  Occasionally he has forgotten to take ranolazine and has not had symptoms of concern.  He asked if the medication could be stopped.    Cardiac History:    Past Surgical History:   Procedure Laterality Date    CARDIAC CATHETERIZATION  11/22/2006    (Dr. Briseno) Normal Coronaries    CARDIAC CATHETERIZATION  02/29/2016    normal coronaries EF 50%    CARDIAC CATHETERIZATION  08/22/2019    Normal Coronaries    CARDIOVASCULAR STRESS TEST  11/21/2006    ([facility] Dr. Briseno) 9 min, 93% THR. EF 48%. Anteroseptal and Inferior Ischemia    CARDIOVASCULAR STRESS TEST  02/25/2016    6 min 30 sec, 91% THR. EF (s) 24%. (R) 43%. Inferoseptal Ischemia    CARDIOVASCULAR STRESS TEST  08/06/2019    6 Min. 7.0 METS. 92% THR. BP- 148/81. EF 42%. PVC. Inferoseptal Infarct with periinfarct Ischemia.    CARDIOVASCULAR STRESS TEST  06/22/2023    Lexiscan- EF 51%. GI Artifacts    ECHO - CONVERTED  11/2006    ([facility]) Dr. Briseno Normal EF     ECHO - CONVERTED  02/25/2016    EF 45%. Inferoseptal WMA    ECHO - CONVERTED   08/06/2019    EF 45%. Inferoseptal WMA. AO- 4.0 Cm    ECHO - CONVERTED  06/22/2023    TLS. EF 55%. Trace-Mild MR. AO- 3.7. RVSP- 20 mmHg       Current Outpatient Medications   Medication Sig Dispense Refill    aspirin 81 MG EC tablet Take 1 tablet by mouth Daily.      atorvastatin (LIPITOR) 10 MG tablet Take 1 tablet by mouth Daily. 90 tablet 3    carvedilol (COREG) 3.125 MG tablet Take 1 tablet by mouth 2 (Two) Times a Day. 180 tablet 3    cetirizine (ZyrTEC) 10 MG tablet Take 1 tablet by mouth Daily.      finasteride (PROSCAR) 5 MG tablet Take 1 tablet by mouth Daily.      glucosamine-chondroitin 500-400 MG capsule capsule Take 1 capsule by mouth Daily.      L-ARGININE MAXIMUM STRENGTH 1000 MG tablet TAKE 1 TABLET BY MOUTH 2 (TWO) TIMES A DAY. 180 each 3    meloxicam (MOBIC) 15 MG tablet Take 1 tablet by mouth Daily.      montelukast (SINGULAIR) 10 MG tablet At night if needed for allergy type symptoms 90 tablet 3    nitroglycerin (NITROSTAT) 0.4 MG SL tablet 1 under the tongue as needed for angina, may repeat q5mins for up three doses 25 tablet 1    omeprazole (PriLOSEC) 20 MG capsule Take 1 capsule by mouth daily. 90 capsule 3    potassium citrate (UROCIT-K) 10 MEQ (1080 MG) CR tablet Take 1 tablet by mouth 2 (Two) Times a Day. 2 tabs twice daily      ranolazine (RANEXA) 1000 MG 12 hr tablet Decrease to 1/2 tablet twice a day      tamsulosin (FLOMAX) 0.4 MG capsule 24 hr capsule Take 1 capsule by mouth Every Night.       No current facility-administered medications for this visit.       Patient has no known allergies.    Past Medical History:   Diagnosis Date    BPH (benign prostatic hyperplasia)     followed by Dr. Llamas    Coronary artery disease     GERD (gastroesophageal reflux disease)     Hyperlipidemia     Hypertension     S/P wrist surgery     bilateral surgical repair       Social History     Socioeconomic History    Marital status:    Tobacco Use    Smoking status: Former     Current packs/day:  "0.00     Average packs/day: 0.5 packs/day for 4.0 years (2.0 ttl pk-yrs)     Types: Cigarettes     Start date: 1972     Quit date: 1976     Years since quittin.7    Smokeless tobacco: Never   Vaping Use    Vaping status: Never Used   Substance and Sexual Activity    Alcohol use: Yes     Alcohol/week: 3.0 standard drinks of alcohol     Types: 3 Drinks containing 0.5 oz of alcohol per week     Comment: occasional mixed drink    Drug use: No    Sexual activity: Not Currently     Partners: Female     Birth control/protection: Post-menopausal       Family History   Problem Relation Age of Onset    COPD Mother     Heart failure Mother     Heart attack Father     Heart disease Father     Heart disease Other         stenting at age 65       Review of Systems   Constitutional: Positive for malaise/fatigue. Negative for diaphoresis and fever.   Cardiovascular: Negative.    Respiratory:  Negative for shortness of breath and sleep disturbances due to breathing.    Endocrine: Negative for polydipsia, polyphagia and polyuria.   Skin:  Negative for color change.        BP Readings from Last 5 Encounters:   24 120/60   24 124/62   23 126/60   21 110/60   20 118/80       Wt Readings from Last 5 Encounters:   24 116 kg (255 lb 3.2 oz)   24 119 kg (261 lb 12.8 oz)   23 113 kg (249 lb 12.8 oz)   23 112 kg (246 lb 14.6 oz)   22 112 kg (246 lb 3.2 oz)       Objective     /60 (BP Location: Left arm, Patient Position: Sitting)   Pulse 72   Ht 188 cm (74.02\")   Wt 116 kg (255 lb 3.2 oz)   BMI 32.75 kg/m²     Vitals and nursing note reviewed.   Constitutional:       Appearance: Healthy appearance. Well-groomed and not in distress.   Eyes:      Conjunctiva/sclera: Conjunctivae normal.      Pupils: Pupils are equal, round, and reactive to light.   HENT:      Head: Normocephalic.   Pulmonary:      Effort: Pulmonary effort is normal.      Breath sounds: Normal " breath sounds.   Cardiovascular:      PMI at left midclavicular line. Normal rate. Regular rhythm.      Murmurs: There is no murmur.   Edema:     Peripheral edema absent.   Abdominal:      General: Bowel sounds are normal.      Palpations: Abdomen is soft.   Musculoskeletal: Normal range of motion.      Cervical back: Normal range of motion and neck supple. Skin:     General: Skin is warm and dry.   Neurological:      Mental Status: Alert, oriented to person, place, and time and oriented to person, place and time.   Psychiatric:         Behavior: Behavior is cooperative.          Procedures: none today          Assessment & Plan   Diagnoses and all orders for this visit:    1. Endothelial dysfunction of coronary artery (Primary)  -     ranolazine (RANEXA) 1000 MG 12 hr tablet; Decrease to 1/2 tablet twice a day    2. Essential hypertension    3. Hypercholesteremia      Endothelial dysfunction/fatigue  -Previous cardiac catheterization: Normal coronaries  -Stress test 2023: Negative for ischemia  -Decrease Ranexa to 500 mg twice daily due to potential side effect of fatigue  -If cardiac symptoms develop patient understands to contact office.  -Continue L-arginine, statin, aspirin    HTN  -BP normal  -Heart rate and rhythm normal  -Continue current dose carvedilol    Hypercholesterolemia  -Continue Lipitor  -Labs followed through PCP office     Follow up visit scheduled.              Electronically signed by OREN Kendrick,  September 3, 2024 16:08 EDT    Dictated Utilizing Dragon Dictation: Part of this note may be an electronic transcription/translation of spoken language to printed text using the Dragon Dictation System.

## 2025-01-07 ENCOUNTER — TELEPHONE (OUTPATIENT)
Dept: CARDIOLOGY | Facility: CLINIC | Age: 70
End: 2025-01-07
Payer: MEDICARE

## 2025-01-07 NOTE — TELEPHONE ENCOUNTER
I spoke with patient and he is aware that there are no available comparative studies for benefit vs risks . He is aware he can also speak with pharmacist on any possible side effects from vinia . He verbalized understanding

## 2025-01-07 NOTE — TELEPHONE ENCOUNTER
Due to being a supplement I am unaware of any comparative studies for benefit versus Ranexa.  I was unable to find any contraindication in taking the supplement.  He may get further discuss with his pharmacist any possible adverse effects from vinia.

## 2025-01-30 ENCOUNTER — OFFICE VISIT (OUTPATIENT)
Dept: CARDIOLOGY | Facility: CLINIC | Age: 70
End: 2025-01-30
Payer: MEDICARE

## 2025-01-30 ENCOUNTER — LAB (OUTPATIENT)
Dept: LAB | Facility: HOSPITAL | Age: 70
End: 2025-01-30
Payer: MEDICARE

## 2025-01-30 VITALS
BODY MASS INDEX: 33.37 KG/M2 | SYSTOLIC BLOOD PRESSURE: 116 MMHG | WEIGHT: 260 LBS | HEART RATE: 74 BPM | HEIGHT: 74 IN | DIASTOLIC BLOOD PRESSURE: 72 MMHG

## 2025-01-30 DIAGNOSIS — E78.00 HYPERCHOLESTEREMIA: ICD-10-CM

## 2025-01-30 DIAGNOSIS — I10 ESSENTIAL HYPERTENSION: ICD-10-CM

## 2025-01-30 DIAGNOSIS — Z79.899 MEDICATION MANAGEMENT: ICD-10-CM

## 2025-01-30 DIAGNOSIS — R07.89 CHEST PRESSURE: ICD-10-CM

## 2025-01-30 DIAGNOSIS — I25.10 ENDOTHELIAL DYSFUNCTION OF CORONARY ARTERY: Primary | ICD-10-CM

## 2025-01-30 DIAGNOSIS — I25.10 ENDOTHELIAL DYSFUNCTION OF CORONARY ARTERY: ICD-10-CM

## 2025-01-30 LAB
ALBUMIN SERPL-MCNC: 4.1 G/DL (ref 3.5–5.2)
ALBUMIN/GLOB SERPL: 1.3 G/DL
ALP SERPL-CCNC: 80 U/L (ref 39–117)
ALT SERPL W P-5'-P-CCNC: 29 U/L (ref 1–41)
ANION GAP SERPL CALCULATED.3IONS-SCNC: 9.2 MMOL/L (ref 5–15)
AST SERPL-CCNC: 25 U/L (ref 1–40)
BASOPHILS # BLD AUTO: 0.05 10*3/MM3 (ref 0–0.2)
BASOPHILS NFR BLD AUTO: 0.6 % (ref 0–1.5)
BILIRUB SERPL-MCNC: 0.4 MG/DL (ref 0–1.2)
BUN SERPL-MCNC: 18 MG/DL (ref 8–23)
BUN/CREAT SERPL: 14.6 (ref 7–25)
CALCIUM SPEC-SCNC: 9.4 MG/DL (ref 8.6–10.5)
CHLORIDE SERPL-SCNC: 106 MMOL/L (ref 98–107)
CHOLEST SERPL-MCNC: 169 MG/DL (ref 0–200)
CO2 SERPL-SCNC: 26.8 MMOL/L (ref 22–29)
CREAT SERPL-MCNC: 1.23 MG/DL (ref 0.76–1.27)
DEPRECATED RDW RBC AUTO: 52.4 FL (ref 37–54)
EGFRCR SERPLBLD CKD-EPI 2021: 63.2 ML/MIN/1.73
EOSINOPHIL # BLD AUTO: 0.47 10*3/MM3 (ref 0–0.4)
EOSINOPHIL NFR BLD AUTO: 6 % (ref 0.3–6.2)
ERYTHROCYTE [DISTWIDTH] IN BLOOD BY AUTOMATED COUNT: 15 % (ref 12.3–15.4)
GLOBULIN UR ELPH-MCNC: 3.1 GM/DL
GLUCOSE SERPL-MCNC: 102 MG/DL (ref 65–99)
HCT VFR BLD AUTO: 46.2 % (ref 37.5–51)
HDLC SERPL-MCNC: 48 MG/DL (ref 40–60)
HGB BLD-MCNC: 14.9 G/DL (ref 13–17.7)
IMM GRANULOCYTES # BLD AUTO: 0.01 10*3/MM3 (ref 0–0.05)
IMM GRANULOCYTES NFR BLD AUTO: 0.1 % (ref 0–0.5)
LDLC SERPL CALC-MCNC: 88 MG/DL (ref 0–100)
LDLC/HDLC SERPL: 1.7 {RATIO}
LYMPHOCYTES # BLD AUTO: 1.41 10*3/MM3 (ref 0.7–3.1)
LYMPHOCYTES NFR BLD AUTO: 18 % (ref 19.6–45.3)
MAGNESIUM SERPL-MCNC: 2 MG/DL (ref 1.6–2.4)
MCH RBC QN AUTO: 30.3 PG (ref 26.6–33)
MCHC RBC AUTO-ENTMCNC: 32.3 G/DL (ref 31.5–35.7)
MCV RBC AUTO: 93.9 FL (ref 79–97)
MONOCYTES # BLD AUTO: 0.96 10*3/MM3 (ref 0.1–0.9)
MONOCYTES NFR BLD AUTO: 12.3 % (ref 5–12)
NEUTROPHILS NFR BLD AUTO: 4.93 10*3/MM3 (ref 1.7–7)
NEUTROPHILS NFR BLD AUTO: 63 % (ref 42.7–76)
NRBC BLD AUTO-RTO: 0 /100 WBC (ref 0–0.2)
PLATELET # BLD AUTO: 234 10*3/MM3 (ref 140–450)
PMV BLD AUTO: 10.7 FL (ref 6–12)
POTASSIUM SERPL-SCNC: 4.3 MMOL/L (ref 3.5–5.2)
PROT SERPL-MCNC: 7.2 G/DL (ref 6–8.5)
RBC # BLD AUTO: 4.92 10*6/MM3 (ref 4.14–5.8)
SODIUM SERPL-SCNC: 142 MMOL/L (ref 136–145)
TRIGL SERPL-MCNC: 196 MG/DL (ref 0–150)
VLDLC SERPL-MCNC: 33 MG/DL (ref 5–40)
WBC NRBC COR # BLD AUTO: 7.83 10*3/MM3 (ref 3.4–10.8)

## 2025-01-30 PROCEDURE — 1160F RVW MEDS BY RX/DR IN RCRD: CPT | Performed by: NURSE PRACTITIONER

## 2025-01-30 PROCEDURE — 80061 LIPID PANEL: CPT

## 2025-01-30 PROCEDURE — 99214 OFFICE O/P EST MOD 30 MIN: CPT | Performed by: NURSE PRACTITIONER

## 2025-01-30 PROCEDURE — 80053 COMPREHEN METABOLIC PANEL: CPT

## 2025-01-30 PROCEDURE — 83735 ASSAY OF MAGNESIUM: CPT

## 2025-01-30 PROCEDURE — 36415 COLL VENOUS BLD VENIPUNCTURE: CPT

## 2025-01-30 PROCEDURE — 3074F SYST BP LT 130 MM HG: CPT | Performed by: NURSE PRACTITIONER

## 2025-01-30 PROCEDURE — 1159F MED LIST DOCD IN RCRD: CPT | Performed by: NURSE PRACTITIONER

## 2025-01-30 PROCEDURE — 85025 COMPLETE CBC W/AUTO DIFF WBC: CPT

## 2025-01-30 PROCEDURE — 3078F DIAST BP <80 MM HG: CPT | Performed by: NURSE PRACTITIONER

## 2025-01-30 RX ORDER — GABAPENTIN 600 MG/1
600 TABLET ORAL 2 TIMES DAILY
COMMUNITY

## 2025-01-30 RX ORDER — LISINOPRIL 5 MG/1
5 TABLET ORAL DAILY
COMMUNITY

## 2025-01-30 NOTE — PROGRESS NOTES
Chief Complaint   Patient presents with    Follow-up     Cardiac management    LABS     Current labs March 2024 . He has concerns about taking potassium supplement  due to side effects .  He would like to have labs drawn today. He is curious about potassium and his lipid panel     Chest Pain     Patient reports he had random episodes of chest discomfort and pain in left arm . He had tried to go off Ranexa but has since resumed taking it again    Hypertension     Patient  reports his BP had been elevated  with readings similar to 158/97 . He has re started himself back on lisnopril and said BP has improved    Med Refill     Needs refills on  atorvastatin, coreg and Singulair  90 day supply to [facility]       Florian Fowler is a 69 y.o. male with HTN, hypercholesterolemia, endothelial dysfunction.  Cardiac catheterization in 2006, 2016 and 2019 showed normal coronaries.  Managed with Ranexa.  Carotid ultrasound showed mild plaque without significant stenosis.     On 6/23/2023 Lexiscan stress test appeared to show GI artifact. Echocardiogram showed mild LVH with normal EF at 51-55%. No significant valvular issues were noted.     Today he returns to the office for a follow-up visit.  Recently he resumed lisinopril for BP management.  Due to potential interactions he stopped taking potassium which he has been on for management of kidney stones.  He admits to left upper chest and arm aching/soreness.  The area is tender when rubbed and does not occur with exertion.  He notices mostly when waking up in the mornings.    Cardiac History:    Past Surgical History:   Procedure Laterality Date    CARDIAC CATHETERIZATION  11/22/2006    (Dr. Briseno) Normal Coronaries    CARDIAC CATHETERIZATION  02/29/2016    normal coronaries EF 50%    CARDIAC CATHETERIZATION  08/22/2019    Normal Coronaries    CARDIOVASCULAR STRESS TEST  11/21/2006    ([facility] Dr. Briseno) 9 min, 93% THR. EF 48%. Anteroseptal and Inferior  Ischemia    CARDIOVASCULAR STRESS TEST  02/25/2016    6 min 30 sec, 91% THR. EF (s) 24%. (R) 43%. Inferoseptal Ischemia    CARDIOVASCULAR STRESS TEST  08/06/2019    6 Min. 7.0 METS. 92% THR. BP- 148/81. EF 42%. PVC. Inferoseptal Infarct with periinfarct Ischemia.    CARDIOVASCULAR STRESS TEST  06/22/2023    Lexiscan- EF 51%. GI Artifacts    ECHO - CONVERTED  11/2006    ([facility]) Dr. Briseno Normal EF     ECHO - CONVERTED  02/25/2016    EF 45%. Inferoseptal WMA    ECHO - CONVERTED  08/06/2019    EF 45%. Inferoseptal WMA. AO- 4.0 Cm    ECHO - CONVERTED  06/22/2023    TLS. EF 55%. Trace-Mild MR. AO- 3.7. RVSP- 20 mmHg       Current Outpatient Medications   Medication Sig Dispense Refill    aspirin 81 MG EC tablet Take 1 tablet by mouth Daily.      atorvastatin (LIPITOR) 10 MG tablet Take 1 tablet by mouth Daily. 90 tablet 3    carvedilol (COREG) 3.125 MG tablet Take 1 tablet by mouth 2 (Two) Times a Day. 180 tablet 3    cetirizine (ZyrTEC) 10 MG tablet Take 1 tablet by mouth Daily.      docusate sodium (COLACE) 50 MG capsule Take 1 capsule by mouth 2 (Two) Times a Day.      finasteride (PROSCAR) 5 MG tablet Take 1 tablet by mouth Daily.      gabapentin (NEURONTIN) 600 MG tablet Take 1 tablet by mouth 2 (Two) Times a Day.      glucosamine-chondroitin 500-400 MG capsule capsule Take 1 capsule by mouth Daily.      L-ARGININE MAXIMUM STRENGTH 1000 MG tablet TAKE 1 TABLET BY MOUTH 2 (TWO) TIMES A DAY. 180 each 3    lisinopril (PRINIVIL,ZESTRIL) 5 MG tablet Take 1 tablet by mouth Daily.      meloxicam (MOBIC) 15 MG tablet Take 1 tablet by mouth Daily.      montelukast (SINGULAIR) 10 MG tablet At night if needed for allergy type symptoms 90 tablet 3    nitroglycerin (NITROSTAT) 0.4 MG SL tablet 1 under the tongue as needed for angina, may repeat q5mins for up three doses 25 tablet 1    omeprazole (PriLOSEC) 20 MG capsule Take 1 capsule by mouth daily. 90 capsule 3    ranolazine (RANEXA) 1000 MG 12 hr tablet Decrease to 1/2  tablet twice a day (Patient taking differently: 1 tablet Every 12 (Twelve) Hours.)      tamsulosin (FLOMAX) 0.4 MG capsule 24 hr capsule Take 1 capsule by mouth Every Night.       No current facility-administered medications for this visit.       Patient has no known allergies.    Past Medical History:   Diagnosis Date    BPH (benign prostatic hyperplasia)     followed by Dr. Llamas    Coronary artery disease     GERD (gastroesophageal reflux disease)     Hyperlipidemia     Hypertension     S/P wrist surgery     bilateral surgical repair       Social History     Socioeconomic History    Marital status:    Tobacco Use    Smoking status: Former     Current packs/day: 0.00     Average packs/day: 0.5 packs/day for 4.0 years (2.0 ttl pk-yrs)     Types: Cigarettes     Start date: 1972     Quit date: 1976     Years since quittin.1    Smokeless tobacco: Never   Vaping Use    Vaping status: Never Used   Substance and Sexual Activity    Alcohol use: Yes     Alcohol/week: 3.0 standard drinks of alcohol     Types: 3 Drinks containing 0.5 oz of alcohol per week     Comment: occasional mixed drink    Drug use: No    Sexual activity: Not Currently     Partners: Female     Birth control/protection: Post-menopausal       Family History   Problem Relation Age of Onset    COPD Mother     Heart failure Mother     Heart attack Father     Heart disease Father     Heart disease Other         stenting at age 65       Review of Systems   Cardiovascular:  Negative for chest pain, dyspnea on exertion, leg swelling, near-syncope and palpitations.   Respiratory:  Negative for shortness of breath and sleep disturbances due to breathing.    Endocrine: Negative for polydipsia, polyphagia and polyuria.   Hematologic/Lymphatic: Negative for bleeding problem.   Musculoskeletal:  Negative for falls.        Left chest wall tenderness        BP Readings from Last 5 Encounters:   25 116/72   24 120/60   24 124/62  "  07/06/23 126/60   02/23/21 110/60       Wt Readings from Last 5 Encounters:   01/30/25 118 kg (260 lb)   09/03/24 116 kg (255 lb 3.2 oz)   02/22/24 119 kg (261 lb 12.8 oz)   07/06/23 113 kg (249 lb 12.8 oz)   06/22/23 112 kg (246 lb 14.6 oz)       Objective     /72 (BP Location: Left arm, Patient Position: Sitting, Cuff Size: Adult)   Pulse 74   Ht 188 cm (74.02\")   Wt 118 kg (260 lb)   BMI 33.36 kg/m²     Vitals and nursing note reviewed.   Constitutional:       Appearance: Healthy appearance. Well-groomed and not in distress.   HENT:      Head: Normocephalic.   Pulmonary:      Effort: Pulmonary effort is normal.      Breath sounds: Normal breath sounds.   Cardiovascular:      PMI at left midclavicular line. Normal rate. Regular rhythm.      Murmurs: There is no murmur.   Edema:     Peripheral edema absent.   Abdominal:      General: Bowel sounds are normal.      Palpations: Abdomen is soft.      Tenderness: There is no abdominal tenderness.   Musculoskeletal:      Cervical back: Neck supple. Skin:     General: Skin is warm and dry.   Neurological:      Mental Status: Alert, oriented to person, place, and time and oriented to person, place and time.      Gait: Gait is intact.   Psychiatric:         Speech: Speech normal.         Behavior: Behavior is cooperative.          Procedures: None today         Assessment & Plan   Diagnoses and all orders for this visit:    1. Endothelial dysfunction of coronary artery (Primary)  -     Comprehensive Metabolic Panel; Future  -     Magnesium; Future    2. Chest pressure  -     Magnesium; Future    3. Essential hypertension  -     Comprehensive Metabolic Panel; Future  -     CBC & Differential; Future  -     Magnesium; Future    4. Hypercholesteremia  -     Lipid Panel; Future    5. Medication management  -     Lipid Panel; Future  -     Comprehensive Metabolic Panel; Future  -     CBC & Differential; Future  -     Magnesium; Future      Endothelial " dysfunction/fatigue  -Previous cardiac catheterization: Normal coronaries  -Stress test 2023: Negative for ischemia  -Decrease Ranexa to 500 mg twice daily due to potential side effect of fatigue  -If cardiac symptoms develop patient understands to contact office.  -Continue L-arginine, statin, aspirin    Chest pressure  -Continue Ranexa  -Patient admits to left chest and upper arm pain occurring at rest and tender to touch, without anginal qualities     HTN  - BP normal today, heart rate and rhythm normal  -Agree with low-dose lisinopril  -Recheck chemistry, renal function  -Advised patient to contact renal in regards to potassium supplement.  Currently he is not taking  -Continue current dose carvedilol     Hypercholesterolemia  -Continue Lipitor  -Lab lab order to include lipid panel and LFTs     Annual follow up visit scheduled.  Please call sooner for cardiac symptoms or concerns.             Electronically signed by OREN Kendrick,  January 30, 2025 09:59 EST    Dictated Utilizing Dragon Dictation: Part of this note may be an electronic transcription/translation of spoken language to printed text using the Dragon Dictation System.

## 2025-02-01 DIAGNOSIS — J30.9 ALLERGIC RHINITIS, UNSPECIFIED SEASONALITY, UNSPECIFIED TRIGGER: ICD-10-CM

## 2025-02-05 RX ORDER — MONTELUKAST SODIUM 10 MG/1
TABLET ORAL
Qty: 90 TABLET | Refills: 0 | Status: SHIPPED | OUTPATIENT
Start: 2025-02-05

## 2025-03-14 DIAGNOSIS — I25.10 ENDOTHELIAL DYSFUNCTION OF CORONARY ARTERY: ICD-10-CM

## 2025-03-14 RX ORDER — RANOLAZINE 1000 MG/1
TABLET, EXTENDED RELEASE ORAL
Qty: 180 TABLET | Refills: 3 | Status: SHIPPED | OUTPATIENT
Start: 2025-03-14

## 2025-03-14 NOTE — TELEPHONE ENCOUNTER
I called patient to verify dose of Ranexa.  Patient states he has been taking Ranexa 1000 mg one tablet twice a day.  Refill sent to [facility].

## 2025-04-25 DIAGNOSIS — I10 ESSENTIAL HYPERTENSION: ICD-10-CM

## 2025-04-29 RX ORDER — CARVEDILOL 3.12 MG/1
3.12 TABLET ORAL 2 TIMES DAILY
Qty: 180 TABLET | Refills: 0 | Status: SHIPPED | OUTPATIENT
Start: 2025-04-29

## 2025-05-02 DIAGNOSIS — J30.9 ALLERGIC RHINITIS, UNSPECIFIED SEASONALITY, UNSPECIFIED TRIGGER: ICD-10-CM

## 2025-05-06 RX ORDER — MONTELUKAST SODIUM 10 MG/1
TABLET ORAL
Qty: 90 TABLET | Refills: 0 | Status: SHIPPED | OUTPATIENT
Start: 2025-05-06

## 2025-07-29 DIAGNOSIS — I10 ESSENTIAL HYPERTENSION: ICD-10-CM

## 2025-07-29 NOTE — TELEPHONE ENCOUNTER
Caller: ANISA KEN    Relationship: SELF    Best call back number:.  Telephone Information:   Mobile [phone]        Requested Prescriptions:   Requested Prescriptions     Pending Prescriptions Disp Refills    carvedilol (COREG) 3.125 MG tablet 180 tablet 0     Sig: Take 1 tablet by mouth 2 (Two) Times a Day.        Pharmacy where request should be sent: [facility] [address] [place] [address] [phone] [facility] [phone] [facility]     Last office visit with prescribing clinician: 1/30/2025   Last telemedicine visit with prescribing clinician: Visit date not found   Next office visit with prescribing clinician: 2/3/2026       Does the patient have less than a 3 day supply:  [x] Yes  [] No    Would you like a call back once the refill request has been completed: [x] Yes [] No    If the office needs to give you a call back, can they leave a voicemail: [x] Yes [] No    Betty Vieira Rep   07/29/25 11:54 EDT

## 2025-07-30 RX ORDER — CARVEDILOL 3.12 MG/1
3.12 TABLET ORAL 2 TIMES DAILY
Qty: 180 TABLET | Refills: 0 | Status: SHIPPED | OUTPATIENT
Start: 2025-07-30

## 2025-07-31 DIAGNOSIS — J30.9 ALLERGIC RHINITIS, UNSPECIFIED SEASONALITY, UNSPECIFIED TRIGGER: ICD-10-CM

## 2025-07-31 RX ORDER — LISINOPRIL 5 MG/1
5 TABLET ORAL DAILY
Qty: 90 TABLET | Refills: 1 | Status: SHIPPED | OUTPATIENT
Start: 2025-07-31

## 2025-07-31 RX ORDER — MONTELUKAST SODIUM 10 MG/1
TABLET ORAL
Qty: 90 TABLET | Refills: 1 | Status: SHIPPED | OUTPATIENT
Start: 2025-07-31

## 2025-07-31 NOTE — TELEPHONE ENCOUNTER
Caller: Dell Fowler TERESA    Relationship: Self    Best call back number: [phone]    Requested Prescriptions:   Requested Prescriptions     Pending Prescriptions Disp Refills    montelukast (SINGULAIR) 10 MG tablet [Pharmacy Med Name: Montelukast Sodium 10 MG Oral Tablet] 90 tablet 0     Sig: TAKE 1 TABLET BY MOUTH NIGHTLY AS NEEDED FOR  ALLERGY  SYMPTOMS    lisinopril (PRINIVIL,ZESTRIL) 5 MG tablet       Sig: Take 1 tablet by mouth Daily.        Pharmacy where request should be sent: [address] [phone] [facility] [phone] FX     Last office visit with prescribing clinician: 1/30/2025   Last telemedicine visit with prescribing clinician: Visit date not found   Next office visit with prescribing clinician: 2/3/2026     Additional details provided by patient:     Does the patient have less than a 3 day supply:  [] Yes  [x] No    Would you like a call back once the refill request has been completed: [] Yes [x] No    If the office needs to give you a call back, can they leave a voicemail: [] Yes [x] No    Betty Thorpe   07/31/25 12:09 EDT